# Patient Record
Sex: FEMALE | Race: ASIAN | NOT HISPANIC OR LATINO | Employment: OTHER | URBAN - METROPOLITAN AREA
[De-identification: names, ages, dates, MRNs, and addresses within clinical notes are randomized per-mention and may not be internally consistent; named-entity substitution may affect disease eponyms.]

---

## 2017-01-30 ENCOUNTER — OFFICE VISIT (OUTPATIENT)
Dept: FAMILY MEDICINE | Facility: CLINIC | Age: 52
End: 2017-01-30

## 2017-01-30 ENCOUNTER — RECORDS - HEALTHEAST (OUTPATIENT)
Dept: ADMINISTRATIVE | Facility: OTHER | Age: 52
End: 2017-01-30

## 2017-01-30 VITALS
SYSTOLIC BLOOD PRESSURE: 110 MMHG | HEART RATE: 91 BPM | DIASTOLIC BLOOD PRESSURE: 73 MMHG | OXYGEN SATURATION: 100 % | TEMPERATURE: 97.9 F

## 2017-01-30 DIAGNOSIS — Z12.39 SCREENING FOR BREAST CANCER: ICD-10-CM

## 2017-01-30 DIAGNOSIS — E11.9 TYPE 2 DIABETES MELLITUS WITHOUT COMPLICATION, WITHOUT LONG-TERM CURRENT USE OF INSULIN (H): Primary | ICD-10-CM

## 2017-01-30 DIAGNOSIS — I10 BENIGN ESSENTIAL HYPERTENSION: ICD-10-CM

## 2017-01-30 DIAGNOSIS — Z12.11 SCREEN FOR COLON CANCER: ICD-10-CM

## 2017-01-30 DIAGNOSIS — E78.5 HYPERLIPIDEMIA LDL GOAL <100: ICD-10-CM

## 2017-01-30 LAB
ANION GAP SERPL CALCULATED.3IONS-SCNC: 14 MMOL/L (ref 5–18)
BUN SERPL-MCNC: 23 MG/DL (ref 8–22)
CALCIUM SERPL-MCNC: 9.5 MG/DL (ref 8.5–10.5)
CHLORIDE SERPL-SCNC: 104 MMOL/L (ref 98–107)
CHOLEST SERPL-MCNC: 150 MG/DL
CO2 SERPL-SCNC: 20 MMOL/L (ref 22–31)
CREAT SERPL-MCNC: 0.66 MG/DL (ref 0.6–1.1)
CREAT UR-MCNC: 60.5 MG/DL
FASTING?: ABNORMAL
GLUCOSE SERPL-MCNC: 139 MG/DL (ref 70–125)
HBA1C MFR BLD: 7.5 % (ref 4.1–5.7)
HCT VFR BLD AUTO: 43.7 % (ref 35–47)
HDLC SERPL-MCNC: 46 MG/DL
HEMOGLOBIN: 13.7 G/DL (ref 11.7–15.7)
LDLC SERPL CALC-MCNC: 63 MG/DL
MCH RBC QN AUTO: 28.3 PG (ref 26.5–35)
MCHC RBC AUTO-ENTMCNC: 31.4 G/DL (ref 32–36)
MCV RBC AUTO: 90.3 FL (ref 78–100)
MICROALBUMIN UR-MCNC: 1.6 MG/DL (ref 0–1.99)
MICROALBUMIN/CREAT UR: 26.4 MG/G
PLATELET # BLD AUTO: 276 K/UL (ref 150–450)
POTASSIUM SERPL-SCNC: 4 MMOL/L (ref 3.5–5)
RBC # BLD AUTO: 4.84 M/UL (ref 3.8–5.2)
SODIUM SERPL-SCNC: 138 MMOL/L (ref 136–145)
TRIGL SERPL-MCNC: 206 MG/DL
TSH SERPL DL<=0.05 MIU/L-ACNC: 2.15 UIU/ML (ref 0.3–5)
WBC # BLD AUTO: 8.2 K/UL (ref 4–11)

## 2017-01-30 RX ORDER — ATORVASTATIN CALCIUM 20 MG/1
10 TABLET, FILM COATED ORAL DAILY
Qty: 90 TABLET | Refills: 3 | Status: SHIPPED | OUTPATIENT
Start: 2017-01-30 | End: 2018-02-12

## 2017-01-30 RX ORDER — LISINOPRIL 5 MG/1
5 TABLET ORAL DAILY
Qty: 90 TABLET | Refills: 3 | Status: SHIPPED | OUTPATIENT
Start: 2017-01-30 | End: 2018-02-12

## 2017-01-30 NOTE — LETTER
March 13, 2017      John Chun  [address] [address] [address]        Dear John,    Mammogram results are normal.     Sincerely,    Mary Almanza, DO

## 2017-01-30 NOTE — PATIENT INSTRUCTIONS
Increase daily activities/exercises.    Decrease your rice intake. Brown rice is better than the white rice.     Start a new medication for your diabetes called METFORMIN.       ~~~~~~~~~~~~~~~~~~~~~~~~~~~~~~~~~~~~~~~~~~~~~~~~~~~~~~~~~~~~~~~~~~~~~~~~~~~~  Your medication list is printed, please keep this with you, it is helpful to bring this current list to any other medical appointments, the emergency room or hospital.    If you had lab testing today and your results are reassuring or normal they will be be mailed to you within 7 days.     If the lab tests need quick action we will call you with the results.   The phone number we will call with results is # [phone] (home) . If this is not the best number please call our clinic and change the number.    If you need any refills please call your pharmacy and they will contact us.    If you have any further concerns or wish to schedule another appointment you must call our office during normal business hours  [phone] (8-5:00 M-F)  If you have urgent medical questions that cannot wait  you may also call [phone] at any time of day.  If you have a medical emergency please call [phone].    Thank you for coming to [facility].        Referral for ( TEST )  :      Ophthalmology  LOCATION/PLACE/Provider :    [facility]  [address]    [address]  [place], [place]  DATE & TIME :     2-7-2017 at 1:45  PHONE :     [phone]  FAX :     [phone]  ADDITIONAL INFORMATION :     NA  Appointment made by clinic staff/:    Key        Referral for ( TEST )  :      Mammogram  LOCATION/PLACE/Provider :    [facility] [address] [place] [address]  DATE & TIME :     2-7-17 at 12:15  PHONE :     [phone]  FAX :     [phone]  ADDITIONAL INFORMATION :     NA  Appointment made by clinic staff/:    Key    Called patient 2/17/17 and she has r/s'd both appointments to  3/6/2017  ---Maida,CMA

## 2017-01-30 NOTE — NURSING NOTE
DUE FOR:  Foot Exam  Mammogram---Referral ordered  Eye Exam--- Referral ordered  Colonoscopy-- declines but OK FOR FIT (Given Kit)  Offer Flu Shot--- declined but was offered.   Labs: A1C

## 2017-01-30 NOTE — Clinical Note
February 2, 2017      John Chun  [address] [facility] [address]        Dear John,    Lab results are stable. Continue to take your medications as prescribed and I will see you back at your appt on 2-20-17    Please see below for your test results.    Resulted Orders   Hemoglobin A1c ([facility])   Result Value Ref Range    Hemoglobin A1C 7.5 (H) 4.1 - 5.7 %   CBC with Plt ([facility])   Result Value Ref Range    WBC 8.2 4.0 - 11.0 K/uL    RBC 4.84 3.80 - 5.20 M/uL    Hemoglobin 13.7 11.7 - 15.7 g/dL    Hematocrit 43.7 35.0 - 47.0 %    MCV 90.3 78.0 - 100.0 fL    MCH 28.3 26.5 - 35.0 pg    MCHC 31.4 (L) 32.0 - 36.0 g/dL    Platelets 276.0 150.0 - 450.0 K/uL   TSH  Sensitive ([facility])   Result Value Ref Range    TSH 2.15 0.30 - 5.00 uIU/mL    Narrative    Test performed by:  [facility]  [address] [place] [address]   Microalbumin Creatinine Ratio Random Ur ([facility])   Result Value Ref Range    Microalbumin, Urine 1.60 0.00 - 1.99 mg/dL    Creatinine, Urine 60.5 mg/dL    Albumin Urine mg/g Cr 26.4 (H) <=19.9 mg/g    Narrative    Test performed by:  [facility]  [address] [place] [address]  Microalbumin, Random Urine  <2.0 mg/dL . . . . . . . . Normal  3.0-30.0 mg/dL . . . . . . Microalbuminuria  >30.0 mg/dL . . . . . .  . Clinical Proteinuria  Microalbumin/Creatinine Ratio, Random Urine  <20 mg/g . . . . .. . . . Normal   mg/g . . . . . . . Microalbuminuria  >300 mg/g . . . . . . . . Clinical Proteinuria   Basic Metabolic Profile ([facility])   Result Value Ref Range    Sodium 138 136 - 145 mmol/L    Potassium 4.0 3.5 - 5.0 mmol/L    Chloride 104 98 - 107 mmol/L    CO2, Total 20 (L) 22 - 31 mmol/L    Anion Gap 14 5 - 18 mmol/L    Glucose 139 (H) 70 - 125 mg/dL    Calcium 9.5 8.5 - 10.5 mg/dL    Urea Nitrogen 23 (H) 8 - 22 mg/dL    Creatinine 0.66 0.60 - 1.10 mg/dL    GFR Estimate If Black >60 >60 mL/min/1.73m2    GFR Estimate >60 >60 mL/min/1.73m2    Narrative     Test performed by:  [facility]  [address] [place] [address]  Fasting Glucose reference range is 70-99 mg/dL per  American Diabetes Association (ADA) guidelines.   Lipid Profile ([employer] [facility])   Result Value Ref Range    Triglycerides 206 (H) <=149 mg/dL    Cholesterol 150 <=199 mg/dL    LDL Cholesterol Calculated 63 <=129 mg/dL    HDL Cholesterol 46 (L) >=50 mg/dL    Fasting? Unknown     Narrative    Test performed by:  [facility] [address] [place] [address]       If you have any questions, please call the clinic to make an appointment.    Sincerely,    Mary Almanza, DO

## 2017-01-30 NOTE — MR AVS SNAPSHOT
After Visit Summary   1/30/2017    John Chun    MRN: [id number]           Patient Information     Date Of Birth          1965        Visit Information        Provider Department      1/30/2017 9:20 AM Mary Almanza DO [facility]        Today's Diagnoses     Type 2 diabetes mellitus without complication, without long-term current use of insulin (H)    -  1     Benign essential hypertension         Hyperlipidemia LDL goal <100         Screen for colon cancer         Screening for breast cancer           Care Instructions    Increase daily activities/exercises.    Decrease your rice intake. Brown rice is better than the white rice.     Start a new medication for your diabetes called METFORMIN.       ~~~~~~~~~~~~~~~~~~~~~~~~~~~~~~~~~~~~~~~~~~~~~~~~~~~~~~~~~~~~~~~~~~~~~~~~~~~~  Your medication list is printed, please keep this with you, it is helpful to bring this current list to any other medical appointments, the emergency room or hospital.    If you had lab testing today and your results are reassuring or normal they will be be mailed to you within 7 days.     If the lab tests need quick action we will call you with the results.   The phone number we will call with results is # [phone] (home) . If this is not the best number please call our clinic and change the number.    If you need any refills please call your pharmacy and they will contact us.    If you have any further concerns or wish to schedule another appointment you must call our office during normal business hours  [phone] (8-5:00 M-F)  If you have urgent medical questions that cannot wait  you may also call [phone] at any time of day.  If you have a medical emergency please call [phone].    Thank you for coming to [facility].          Follow-ups after your visit        Additional Services     OPHTHALMOLOGY ADULT REFERRAL       Patient prefers to be called    Diagnosis/Reason for Referral: Screening DM  routine eye exam     needed: No  Language: Hmong      Referral should be tracked? Yes                  Follow-up notes from your care team     Return in about 4 weeks (around 2017) for DM recheck.      Future tests that were ordered for you today     Open Future Orders        Priority Expected Expires Ordered    Fecal Occult Blood - FIT, iFOB ([facility]) Routine  2017            Who to contact     Please call your clinic at [phone] to:    Ask questions about your health    Make or cancel appointments    Discuss your medicines    Learn about your test results    Speak to your doctor   If you have compliments or concerns about an experience at your clinic, or if you wish to file a complaint, please contact [facility] Patient Relations at [phone] or email us at [email or web address]         Additional Information About Your Visit        [email or web address] Information     [facility] is an electronic gateway that provides easy, online access to your medical records. With [employer] [facility], you can request a clinic appointment, read your test results, renew a prescription or communicate with your care team.     To sign up for [employer] [facility] visit the website at [email or web address]   You will be asked to enter the access code listed below, as well as some personal information. Please follow the directions to create your username and password.     Your access code is: [email or web address]  Expires: 2017 10:28 AM     Your access code will  in 90 days. If you need help or a new code, please contact your [facility] or call [phone] for assistance.        Care EveryWhere ID     This is your Care EveryWhere ID. This could be used by other organizations to access your [facility] medical records  [id number]        Your Vitals Were     Pulse Temperature Pulse Oximetry             91 97.9  F (36.6  C) (Oral) 100%          Blood Pressure from  Last 3 Encounters:   01/30/17 110/73   03/23/16 135/88   05/19/15 135/84    Weight from Last 3 Encounters:   03/23/16 121 lb (54.885 kg)   05/19/15 125 lb 14.4 oz (57.108 kg)   05/05/14 118 lb 3.2 oz (53.615 kg)              We Performed the Following     Basic Metabolic Panel ([facility]) - Results < 1 hr     C FOOT EXAM  NO CHARGE     CBC with Plt ([facility])     Hemoglobin A1c ([facility])     Lipid Panel ([facility]) - Results < 1 hr     MA SCREENING DIGITAL BILAT     Microalbumin Creatinine Ratio Random Ur ([facility])     OPHTHALMOLOGY ADULT REFERRAL     TSH  Sensitive ([facility])          Today's Medication Changes          These changes are accurate as of: 1/30/17 10:28 AM.  If you have any questions, ask your nurse or doctor.               Start taking these medicines.        Dose/Directions    blood glucose lancets standard   Commonly known as:  no brand specified   Used for:  Type 2 diabetes mellitus without complication, without long-term current use of insulin (H)   Started by:  Mary Almanza DO        Use to test blood sugar 2 times daily or as directed.   Quantity:  1 Box   Refills:  3       blood glucose monitoring meter device kit   Used for:  Type 2 diabetes mellitus without complication, without long-term current use of insulin (H)   Started by:  Mary Almanza DO        Check sugars 2 times a day. May Substitute covered meter and supplies   Quantity:  1 kit   Refills:  0       metFORMIN 500 MG tablet   Commonly known as:  GLUCOPHAGE   Used for:  Type 2 diabetes mellitus without complication, without long-term current use of insulin (H)   Started by:  Mary Almanza DO        Dose:  500 mg   Take 1 tablet (500 mg) by mouth 2 times daily (with meals)   Quantity:  180 tablet   Refills:  1         These medicines have changed or have updated prescriptions.        Dose/Directions    blood glucose monitoring test strip   Commonly known as:  no brand specified   This may have changed:    - how to take this  -  when to take this  - additional instructions  - Another medication with the same name was removed. Continue taking this medication, and follow the directions you see here.   Used for:  Type 2 diabetes mellitus without complication, without long-term current use of insulin (H)   Changed by:  Mary Almanza, DO        Use to test blood sugars 2 times daily or as directed for Christi or may substitute   Quantity:  100 strip   Refills:  3            Where to get your medicines      These medications were sent to [id number] - [place], [place] - [address] AT [facility]  [address], [place] [address]     Phone:  [phone]    - atorvastatin 20 MG tablet  - blood glucose lancets standard  - blood glucose monitoring meter device kit  - blood glucose monitoring test strip  - lisinopril 5 MG tablet  - metFORMIN 500 MG tablet             Primary Care Provider Office Phone # Fax #    Angela Pantoja PA-C [phone]       [facility] [address] [address] [address] [address]        Thank you!     Thank you for choosing [facility]  for your care. Our goal is always to provide you with excellent care. Hearing back from our patients is one way we can continue to improve our services. Please take a few minutes to complete the written survey that you may receive in the mail after your visit with us. Thank you!             Your Updated Medication List - Protect others around you: Learn how to safely use, store and throw away your medicines at www.disposemymeds.org.          This list is accurate as of: 1/30/17 10:28 AM.  Always use your most recent med list.                   Brand Name Dispense Instructions for use    atorvastatin 20 MG tablet    LIPITOR    90 tablet    Take 0.5 tablets (10 mg) by mouth daily For 1 week, then increase to 1 tab daily       blood glucose lancets standard    no brand specified    1 Box    Use  to test blood sugar 2 times daily or as directed.       blood glucose monitoring meter device kit     1 kit    Check sugars 2 times a day. May Substitute covered meter and supplies       blood glucose monitoring test strip    no brand specified    100 strip    Use to test blood sugars 2 times daily or as directed for Christi or may substitute       lisinopril 5 MG tablet    PRINIVIL/ZESTRIL    90 tablet    Take 1 tablet (5 mg) by mouth daily       metFORMIN 500 MG tablet    GLUCOPHAGE    180 tablet    Take 1 tablet (500 mg) by mouth 2 times daily (with meals)

## 2017-01-30 NOTE — PROGRESS NOTES
HPI:       John Chun is a 51 year old  female with a significant past medical history of diabetes and hypertension who presents for follow up of concern(s) listed below    1. Diabetes: has been out of her strips for a few months, had been checking daily, fasting am (), after lunch 132, after dinner 105-243. She is currently diet controlled, however she does eat rice daily and limited exercise due to time constraints.  2. Hyperlipidemia: has been on statin therapy and tolerates medication with no adverse side effects  3. Blood pressure: Currently on ACEI and tolerates well.     [employer] - [employer]! was used as a [place]  was used for this visit.         PMHX:     Patient Active Problem List   Diagnosis     Type 2 diabetes mellitus without complication (HCC)     Benign essential hypertension     Hyperlipidemia LDL goal <100     Perimenopausal       Current Outpatient Prescriptions   Medication Sig Dispense Refill     atorvastatin (LIPITOR) 20 MG tablet Take 0.5 tablets (10 mg) by mouth daily For 1 week, then increase to 1 tab daily 90 tablet 3     lisinopril (PRINIVIL,ZESTRIL) 5 MG tablet Take 1 tablet (5 mg) by mouth daily 90 tablet 3     blood glucose (ONE TOUCH ULTRA) test strip Use to test blood sugars one time daily or as directed. 100 strip 3     glucose blood VI test strips strip by In Vitro route daily. 1 Box 6       Social History     Social History     Marital Status: Unknown     Spouse Name: N/A     Number of Children: N/A     Years of Education: N/A     Occupational History     Not on file.     Social History Main Topics     Smoking status: Never Smoker      Smokeless tobacco: Not on file     Alcohol Use: No     Drug Use: No     Sexual Activity: No     Other Topics Concern     Not on file     Social History Narrative          Allergies   Allergen Reactions     No Known Drug Allergy        Results for orders placed or performed in visit on 01/30/17 (from the past 24 hour(s))   Hemoglobin A1c ([facility])    Result Value Ref Range    Hemoglobin A1C 7.5 (H) 4.1 - 5.7 %            Review of Systems:   C: NEGATIVE for fatigue, unexpected change in weight  R: NEGATIVE for significant cough or shortness of breath  CV: NEGATIVE for chest pain, palpitations or new or worsening peripheral edema  P: NEGATIVE for changes in mood or affect          Physical Exam:     Filed Vitals:    01/30/17 0934   BP: 110/73   Pulse: 91   Temp: 97.9  F (36.6  C)   TempSrc: Oral   SpO2: 100%     There is no weight on file to calculate BMI.    GENERAL APPEARANCE: healthy, alert and no distress,  EYES: Eyes grossly normal to inspection,  PERRL  HENT: ear canals and TM's normal and nose and mouth without ulcers or lesions  NECK: no adenopathy, no asymmetry, masses, or scars and thyroid normal to palpation  RESP: lungs clear to auscultation - no rales, rhonchi or wheezes  CV: regular rate and rhythm,  and no murmur, click,  rub or gallop  MS: Diabetic foot exam: normal monofilament testing, no sores or lesions of feet, dry skin at heels bilateral      Assessment and Plan   1. Type 2 diabetes mellitus without complication, without long-term current use of insulin (H)    - Hemoglobin A1c ([facility])  - C FOOT EXAM  NO CHARGE  - TSH  Sensitive ([facility])  - Microalbumin Creatinine Ratio Random Ur ([facility])  - blood glucose monitoring ([employer] CONTOUR MONITOR) meter device kit; Check sugars 2 times a day. May Substitute covered meter and supplies  Dispense: 1 kit; Refill: 0  - blood glucose monitoring (NO BRAND SPECIFIED) test strip; Use to test blood sugars 2 times daily or as directed for Christi or may substitute  Dispense: 100 strip; Refill: 3  - OPHTHALMOLOGY ADULT REFERRAL  - metFORMIN (GLUCOPHAGE) 500 MG tablet; Take 1 tablet (500 mg) by mouth 2 times daily (with meals)  Dispense: 180 tablet; Refill: 1  - blood glucose (NO BRAND SPECIFIED) lancets standard; Use to test blood sugar 2 times daily or as directed.  Dispense: 1 Box; Refill: 3  - Return  in 4 weeks to recheck blood sugars and tolerating metformin  Patient was agreeable to starting medication after our discussion of risk vs. benefit and possible adverse side effects      2. Benign essential hypertension    - Basic Metabolic Panel ([facility]) - Results < 1 hr  - CBC with Plt ([facility])  - lisinopril (PRINIVIL/ZESTRIL) 5 MG tablet; Take 1 tablet (5 mg) by mouth daily  Dispense: 90 tablet; Refill: 3    3. Hyperlipidemia LDL goal <100    - Lipid Panel ([facility]) - Results < 1 hr  - atorvastatin (LIPITOR) 20 MG tablet; Take 0.5 tablets (10 mg) by mouth daily For 1 week, then increase to 1 tab daily  Dispense: 90 tablet; Refill: 3    4. Screen for colon cancer    - Fecal Occult Blood - FIT, iFOB ([facility]); Future    5. Screening for breast cancer    - MA SCREENING DIGITAL BILAT        Options for treatment and follow-up care were reviewed with the patient and/or guardian. John Chun and/or guardian engaged in the decision making process and verbalized understanding of the options discussed and agreed with the final plan.    Mary Almanza, DO

## 2017-02-20 ENCOUNTER — OFFICE VISIT (OUTPATIENT)
Dept: FAMILY MEDICINE | Facility: CLINIC | Age: 52
End: 2017-02-20

## 2017-02-20 VITALS
RESPIRATION RATE: 16 BRPM | OXYGEN SATURATION: 100 % | HEART RATE: 90 BPM | HEIGHT: 59 IN | WEIGHT: 122 LBS | TEMPERATURE: 97.9 F | DIASTOLIC BLOOD PRESSURE: 84 MMHG | SYSTOLIC BLOOD PRESSURE: 135 MMHG | BODY MASS INDEX: 24.6 KG/M2

## 2017-02-20 DIAGNOSIS — E11.9 TYPE 2 DIABETES MELLITUS WITHOUT COMPLICATION, WITHOUT LONG-TERM CURRENT USE OF INSULIN (H): ICD-10-CM

## 2017-02-20 NOTE — MR AVS SNAPSHOT
After Visit Summary   2/20/2017    John Chun    MRN: [id number]           Patient Information     Date Of Birth          1965        Visit Information        Provider Department      2/20/2017 9:00 AM Mary Almanza DO [facility]        Today's Diagnoses     Type 2 diabetes mellitus without complication, without long-term current use of insulin (H)          Care Instructions    For your diabetes:    Metformin 500 mg - Take 2 tablets in the morning and 1 tablet in the evening.    Follow up at the end of April 2017 and we will recheck your hemoglobin A1C.          Your medication list is printed, please keep this with you, it is helpful to bring this current list to any other medical appointments, the emergency room or hospital.    If you had lab testing today and your results are reassuring or normal they will be be mailed to you within 7 days.     If the lab tests need quick action we will call you with the results.   The phone number we will call with results is # [phone] (home) . If this is not the best number please call our clinic and change the number.    If you need any refills please call your pharmacy and they will contact us.    If you have any further concerns or wish to schedule another appointment you must call our office during normal business hours  [phone] (8-5:00 M-F)  If you have urgent medical questions that cannot wait  you may also call [phone] at any time of day.  If you have a medical emergency please call [phone].    Thank you for coming to [facility].          Follow-ups after your visit        Who to contact     Please call your clinic at [phone] to:    Ask questions about your health    Make or cancel appointments    Discuss your medicines    Learn about your test results    Speak to your doctor   If you have compliments or concerns about an experience at your clinic, or if you wish to file a complaint, please contact [facility]  "[facility] Patient Relations at [phone] or email us at [email or web address]         Additional Information About Your Visit        Bloomfirehart Information     [employer] is an electronic gateway that provides easy, online access to your medical records. With FortunePay, you can request a clinic appointment, read your test results, renew a prescription or communicate with your care team.     To sign up for [employer] visit the website at [email or web address]   You will be asked to enter the access code listed below, as well as some personal information. Please follow the directions to create your username and password.     Your access code is: [email or web address]  Expires: 2017 10:28 AM     Your access code will  in 90 days. If you need help or a new code, please contact your [facility] or call [phone] for assistance.        Care EveryWhere ID     This is your Care EveryWhere ID. This could be used by other organizations to access your [place] medical records  [id number]        Your Vitals Were     Pulse Temperature Respirations Height Pulse Oximetry BMI (Body Mass Index)    90 97.9  F (36.6  C) (Oral) 16 4' 10.5\" (148.6 cm) 100% 25.06 kg/m2       Blood Pressure from Last 3 Encounters:   17 135/84   17 110/73   16 135/88    Weight from Last 3 Encounters:   17 122 lb (55.3 kg)   16 121 lb (54.9 kg)   05/19/15 125 lb 14.4 oz (57.1 kg)              Today, you had the following     No orders found for display         Today's Medication Changes          These changes are accurate as of: 17  9:30 AM.  If you have any questions, ask your nurse or doctor.               These medicines have changed or have updated prescriptions.        Dose/Directions    metFORMIN 500 MG tablet   Commonly known as:  GLUCOPHAGE   This may have changed:    - how much to take  - how to take this  - when to take this  - additional instructions "   Used for:  Type 2 diabetes mellitus without complication, without long-term current use of insulin (H)   Changed by:  Mary Almanza DO        Take 2 tablets in the morning and 1 tablet in the evening   Quantity:  270 tablet   Refills:  1            Where to get your medicines      These medications were sent to [id number] - [place], [place] - [address] [place] [address] AT [facility]  [address] [place] [address], [place] [address]     Phone:  [phone]     metFORMIN 500 MG tablet                Primary Care Provider Office Phone # Fax #    Mary Archie  [phone]       [facility] [address] [facility] [address]        Thank you!     Thank you for choosing [facility]  for your care. Our goal is always to provide you with excellent care. Hearing back from our patients is one way we can continue to improve our services. Please take a few minutes to complete the written survey that you may receive in the mail after your visit with us. Thank you!             Your Updated Medication List - Protect others around you: Learn how to safely use, store and throw away your medicines at www.disposemymeds.org.          This list is accurate as of: 2/20/17  9:30 AM.  Always use your most recent med list.                   Brand Name Dispense Instructions for use    atorvastatin 20 MG tablet    LIPITOR    90 tablet    Take 0.5 tablets (10 mg) by mouth daily For 1 week, then increase to 1 tab daily       blood glucose lancets standard    no brand specified    1 Box    Use to test blood sugar 2 times daily or as directed.       blood glucose monitoring meter device kit     1 kit    Check sugars 2 times a day. May Substitute covered meter and supplies       blood glucose monitoring test strip    no brand specified    100 strip    Use to test blood sugars 2 times daily or as directed for Christi or may substitute       lisinopril 5 MG tablet     PRINIVIL/ZESTRIL    90 tablet    Take 1 tablet (5 mg) by mouth daily       metFORMIN 500 MG tablet    GLUCOPHAGE    270 tablet    Take 2 tablets in the morning and 1 tablet in the evening

## 2017-02-20 NOTE — PATIENT INSTRUCTIONS
For your diabetes:    Metformin 500 mg - Take 2 tablets in the morning and 1 tablet in the evening.    Follow up at the end of April 2017 and we will recheck your hemoglobin A1C.          Your medication list is printed, please keep this with you, it is helpful to bring this current list to any other medical appointments, the emergency room or hospital.    If you had lab testing today and your results are reassuring or normal they will be be mailed to you within 7 days.     If the lab tests need quick action we will call you with the results.   The phone number we will call with results is # [phone] (home) . If this is not the best number please call our clinic and change the number.    If you need any refills please call your pharmacy and they will contact us.    If you have any further concerns or wish to schedule another appointment you must call our office during normal business hours  [phone] (8-5:00 M-F)  If you have urgent medical questions that cannot wait  you may also call [phone] at any time of day.  If you have a medical emergency please call [phone].    Thank you for coming to [facility].

## 2017-02-20 NOTE — NURSING NOTE
Mammogram- reschedule to March 6, 2017.  Colonoscopy/ fit test - pt still has fit test at home. Will plan for next visit.  Eye exam - pt schedule appt.  Flu shot - pt declined.

## 2017-02-20 NOTE — PROGRESS NOTES
HPI:       John Chun is a 51 year old  female with a significant past medical history of diabetes who presents for follow up of concern(s) listed below    1. Diabetes: We discussed her lab results from her appt on 1-30-17. She is currently on metformin, atorvastatin and lisinopril. Discussed the ACEI is to help protect her kidney function. Despite starting the metformin her blood sugars have remained high, with her midday in the 160s, and her evening blood sugar at 284 (however she only had one value). She did have a little GI upset for 1 week after starting the metformin, but this has resolved.    The 10-year ASCVD risk score (Katelyn AGUDELO Jr., et al., 2013) is: 3%    Values used to calculate the score:      Age: 51 years      Sex: Female      Is Non- : No      Diabetic: Yes      Tobacco smoker: No      Systolic Blood Pressure: 135 mmHg      Is Prescribed Antihypertensives: Yes      HDL Cholesterol: 46 mg/dL      Total Cholesterol: 150 mg/dL     A UserVoice was used as LetGiveong  for this visit.          PMHX:     Patient Active Problem List   Diagnosis     Type 2 diabetes mellitus without complication (HCC)     Benign essential hypertension     Hyperlipidemia LDL goal <100     Perimenopausal       Current Outpatient Prescriptions   Medication Sig Dispense Refill     metFORMIN (GLUCOPHAGE) 500 MG tablet Take 2 tablets in the morning and 1 tablet in the evening 270 tablet 1     lisinopril (PRINIVIL/ZESTRIL) 5 MG tablet Take 1 tablet (5 mg) by mouth daily 90 tablet 3     blood glucose monitoring (CHRISTI CONTOUR MONITOR) meter device kit Check sugars 2 times a day. May Substitute covered meter and supplies 1 kit 0     blood glucose monitoring (NO BRAND SPECIFIED) test strip Use to test blood sugars 2 times daily or as directed for Christi or may substitute 100 strip 3     atorvastatin (LIPITOR) 20 MG tablet Take 0.5 tablets (10 mg) by mouth daily For 1 week, then increase to 1 tab daily  "90 tablet 3     blood glucose (NO BRAND SPECIFIED) lancets standard Use to test blood sugar 2 times daily or as directed. 1 Box 3     [DISCONTINUED] metFORMIN (GLUCOPHAGE) 500 MG tablet Take 1 tablet (500 mg) by mouth 2 times daily (with meals) 180 tablet 1              Allergies   Allergen Reactions     No Known Drug Allergy        No results found for this or any previous visit (from the past 24 hour(s)).           Physical Exam:     Vitals:    02/20/17 0859 02/20/17 0909   BP: 167/82 135/84   Pulse: 88 90   Resp: 16    Temp: 97.9  F (36.6  C)    TempSrc: Oral    SpO2: 100%    Weight: 122 lb (55.3 kg)    Height: 4' 10.5\" (148.6 cm)      Body mass index is 25.06 kg/(m^2).    GENERAL APPEARANCE: healthy, alert and no distress,  HENT: ear canals and TM's normal and nose and mouth without ulcers or lesions  RESP: lungs clear to auscultation - no rales, rhonchi or wheezes  CV: regular rate and rhythm,  and no murmur, click,  rub or gallop      Assessment and Plan     1. Type 2 diabetes mellitus without complication, without long-term current use of insulin (H)  - Will increase her metformin today, recheck A1C 5-1-17  - metFORMIN (GLUCOPHAGE) 500 MG tablet; Take 2 tablets in the morning and 1 tablet in the evening  Dispense: 270 tablet; Refill: 1      Options for treatment and follow-up care were reviewed with the patient and/or guardian. John Chun and/or guardian engaged in the decision making process and verbalized understanding of the options discussed and agreed with the final plan.    Mary Almanza, DO            "

## 2017-03-06 ENCOUNTER — TRANSFERRED RECORDS (OUTPATIENT)
Dept: HEALTH INFORMATION MANAGEMENT | Facility: CLINIC | Age: 52
End: 2017-03-06

## 2017-03-06 ENCOUNTER — HOSPITAL ENCOUNTER (OUTPATIENT)
Dept: MAMMOGRAPHY | Facility: HOSPITAL | Age: 52
Discharge: HOME | End: 2017-03-06
Attending: FAMILY MEDICINE

## 2017-03-06 DIAGNOSIS — Z12.31 VISIT FOR SCREENING MAMMOGRAM: ICD-10-CM

## 2017-03-06 LAB — MAMMOGRAM: NORMAL

## 2017-09-01 ENCOUNTER — RECORDS - HEALTHEAST (OUTPATIENT)
Dept: ADMINISTRATIVE | Facility: OTHER | Age: 52
End: 2017-09-01

## 2017-09-01 ENCOUNTER — OFFICE VISIT (OUTPATIENT)
Dept: FAMILY MEDICINE | Facility: CLINIC | Age: 52
End: 2017-09-01

## 2017-09-01 VITALS
SYSTOLIC BLOOD PRESSURE: 138 MMHG | WEIGHT: 115.4 LBS | TEMPERATURE: 98.1 F | BODY MASS INDEX: 23.26 KG/M2 | DIASTOLIC BLOOD PRESSURE: 88 MMHG | HEART RATE: 98 BPM | HEIGHT: 59 IN | RESPIRATION RATE: 16 BRPM | OXYGEN SATURATION: 98 %

## 2017-09-01 DIAGNOSIS — D49.2 SOFT TISSUE TUMOR: ICD-10-CM

## 2017-09-01 DIAGNOSIS — M75.41 IMPINGEMENT SYNDROME OF RIGHT SHOULDER: ICD-10-CM

## 2017-09-01 DIAGNOSIS — E11.9 TYPE 2 DIABETES MELLITUS WITHOUT COMPLICATION, WITHOUT LONG-TERM CURRENT USE OF INSULIN (H): Primary | ICD-10-CM

## 2017-09-01 LAB
BUN SERPL-MCNC: 13 MG/DL (ref 7–30)
CALCIUM SERPL-MCNC: 9.6 MG/DL (ref 8.5–10.4)
CHLORIDE SERPLBLD-SCNC: 101 MMOL/L (ref 94–109)
CO2 SERPL-SCNC: 28 MMOL/L (ref 20–32)
CREAT SERPL-MCNC: 0.7 MG/DL (ref 0.6–1.3)
EGFR CALCULATED (BLACK REFERENCE): >90 ML/MIN
EGFR CALCULATED (NON BLACK REFERENCE): >90 ML/MIN
GLUCOSE SERPL-MCNC: 248 MG/DL (ref 60–109)
HBA1C MFR BLD: 8.5 % (ref 4.1–5.7)
POTASSIUM SERPL-SCNC: 3.6 MMOL/L (ref 3.4–5.3)
SODIUM SERPL-SCNC: 138 MMOL/L (ref 133–144)

## 2017-09-01 RX ORDER — NAPROXEN 500 MG/1
500 TABLET ORAL 2 TIMES DAILY WITH MEALS
Qty: 60 TABLET | Refills: 1 | Status: SHIPPED | OUTPATIENT
Start: 2017-09-01 | End: 2017-10-03

## 2017-09-01 NOTE — LETTER
September 21, 2017      John Chun  [address] [place] [address]        Dear oJhn,    Scan shows a collection of fluid, no other worrisome finding and that is good. We can just leave it alone, or drain the fluid collection, but it is possible it would come back.     If you have any questions, please call the clinic to make an appointment.    Sincerely,    Mary Almanza, DO

## 2017-09-01 NOTE — PATIENT INSTRUCTIONS
Avoid rice, potatoes, bread and pasta.    New prescription for Metformin 1000 mg - 1 tablet in the morning and 1 tablet in the evening. (this is an increase dose from your previous dose of Metformin 500 mg)    Finish up your metformin 500 mg tablet that you have at home - 2 tablet in am and 2 tablet in pm then start the new dose as noted above.    Follow up 3 months for your diabetes.      Your medication list is printed, please keep this with you, it is helpful to bring this current list to any other medical appointments, the emergency room or hospital.    If you had lab testing today and your results are reassuring or normal they will be be mailed to you within 7 days.     If the lab tests need quick action we will call you with the results.   The phone number we will call with results is # [phone] (home) . If this is not the best number please call our clinic and change the number.    If you need any refills please call your pharmacy and they will contact us.    If you have any further concerns or wish to schedule another appointment you must call our office during normal business hours  [phone] (8-5:00 M-F)  If you have urgent medical questions that cannot wait  you may also call [phone] at any time of day.  If you have a medical emergency please call [phone].    Thank you for coming to [facility].      Referral for ( TEST )  :       MRI Lower Extremity  LOCATION/PLACE/Provider :    [facility]  DATE & TIME :     9- at 5:45pm  PHONE :     [phone]  FAX :     [phone]  ADDITIONAL INFORMATION :     NA  Appointment made by clinic staff/:    RANDI

## 2017-09-01 NOTE — PROGRESS NOTES
HPI:       John Chun is a 52 year old  female with a significant past medical history of diabetes and hypertension who presents for follow up of concern(s) listed below    1. Diabetes: Check blood sugars every morning, 140, 160-170, notices based on what she eats the night if her blood sugars are elevated,  tries to watch her carb intake, no exercise  Does not always take 2 tabs in am the morning with breakfast, 7.5% in 1/2017  No numbness or tingling in feet. No chest pains or shortness of breath    2. Right shoulder: Right shoulder pain x 2-3 weeks, right handed, she does sewing, and notices some discomfort with sewing, unable to lift her arm above her head           PMHX:     Patient Active Problem List   Diagnosis     Type 2 diabetes mellitus without complication (HCC)     Benign essential hypertension     Hyperlipidemia LDL goal <100     Perimenopausal       Current Outpatient Prescriptions   Medication Sig Dispense Refill     metFORMIN (GLUCOPHAGE) 1000 MG tablet Take 1 tablet (1,000 mg) by mouth 2 times daily (with meals) 60 tablet 3     naproxen (NAPROSYN) 500 MG tablet Take 1 tablet (500 mg) by mouth 2 times daily (with meals) 60 tablet 1     [DISCONTINUED] metFORMIN (GLUCOPHAGE) 500 MG tablet Take 2 tablets in the morning and 1 tablet in the evening 270 tablet 1     blood glucose monitoring (CHRISTI CONTOUR MONITOR) meter device kit Check sugars 2 times a day. May Substitute covered meter and supplies 1 kit 0     blood glucose monitoring (NO BRAND SPECIFIED) test strip Use to test blood sugars 2 times daily or as directed for Christi or may substitute 100 strip 3     atorvastatin (LIPITOR) 20 MG tablet Take 0.5 tablets (10 mg) by mouth daily For 1 week, then increase to 1 tab daily 90 tablet 3     lisinopril (PRINIVIL/ZESTRIL) 5 MG tablet Take 1 tablet (5 mg) by mouth daily 90 tablet 3     blood glucose (NO BRAND SPECIFIED) lancets standard Use to test blood sugar 2 times daily or as directed. 1  "Box 3       Social History     Social History     Marital status: Unknown     Spouse name: N/A     Number of children: N/A     Years of education: N/A     Occupational History     Not on file.     Social History Main Topics     Smoking status: Never Smoker     Smokeless tobacco: Not on file     Alcohol use No     Drug use: No     Sexual activity: No     Other Topics Concern     Not on file     Social History Narrative          Allergies   Allergen Reactions     No Known Drug Allergy        Results for orders placed or performed in visit on 09/01/17 (from the past 24 hour(s))   Hemoglobin A1c (P FM)   Result Value Ref Range    Hemoglobin A1C 8.5 (H) 4.1 - 5.7 %   Basic Metabolic Panel (P FM)  - Results < 1 hr   Result Value Ref Range    Glucose 248.0 (H) 60.0 - 109.0 mg/dL    Urea Nitrogen 13.0 7.0 - 30.0 mg/dL    Creatinine 0.7 0.6 - 1.3 mg/dL    Sodium 138.0 133.0 - 144.0 mmol/L    Potassium 3.6 3.4 - 5.3 mmol/L    Chloride 101.0 94.0 - 109.0 mmol/L    Carbon Dioxide 28.0 20.0 - 32.0 mmol/L    Calcium 9.6 8.5 - 10.4 mg/dL    eGFR Calculated (Non Black Reference) >90 >60.0 mL/min    eGFR Calculated (Black Reference) >90 >60.0 mL/min            Review of Systems:   E: NEGATIVE for acute vision problems or changes  R: NEGATIVE for significant cough or shortness of breath  CV: NEGATIVE for chest pain, palpitations or new or worsening peripheral edema  GI: NEGATIVE for new onset or worsening nausea, vomiting or diarrhea  MUSCULOSKELETAL: Right shoulder pain  P: NEGATIVE for changes in mood or affect  Lesion on right buttock, has been present for years, but recently increasing in size, not painful          Physical Exam:     Vitals:    09/01/17 1303   BP: 138/88   BP Location: Right arm   Pulse: 98   Resp: 16   Temp: 98.1  F (36.7  C)   TempSrc: Oral   SpO2: 98%   Weight: 115 lb 6.4 oz (52.3 kg)   Height: 4' 10.5\" (148.6 cm)     Body mass index is 23.71 kg/(m^2).    GENERAL APPEARANCE: healthy, alert and no " distress,  NECK: no adenopathy, no asymmetry, masses, or scars and thyroid normal to palpation  RESP: lungs clear to auscultation - no rales, rhonchi or wheezes  CV: regular rate and rhythm,  and no murmur, click,  rub or gallop  MS: right shoulder: no pain with palpation, unable to lift her arm over her head, limited internal rotation  SKIN: large, 6cm x 4 cm, palpable mass, nontender, no erythema or drainage      Assessment and Plan     1. Type 2 diabetes mellitus without complication, without long-term current use of insulin (H)  - Hemoglobin A1c ([facility])  - Basic Metabolic Panel ([facility])  - Results < 1 hr  - metFORMIN (GLUCOPHAGE) 1000 MG tablet; Take 1 tablet (1,000 mg) by mouth 2 times daily (with meals)  Dispense: 60 tablet; Refill: 3    2. Impingement syndrome of right shoulder  -Recommended PT, but wanted to wait for now  - naproxen (NAPROSYN) 500 MG tablet; Take 1 tablet (500 mg) by mouth 2 times daily (with meals)  Dispense: 60 tablet; Refill: 1    3. Soft tissue tumor  -Has a growing tumor, 7 lb unintentional weight loss since last visit, probably will need biopsy  - MRI Lower extremity non joint rt w/o contrast*      Options for treatment and follow-up care were reviewed with the patient and/or guardian. John Chun and/or guardian engaged in the decision making process and verbalized understanding of the options discussed and agreed with the final plan.    Mary Almanza DO

## 2017-09-01 NOTE — MR AVS SNAPSHOT
After Visit Summary   9/1/2017    John Chun    MRN: [id number]           Patient Information     Date Of Birth          1965        Visit Information        Provider Department      9/1/2017 1:00 PM Mary Almanza DO [facility]        Today's Diagnoses     Type 2 diabetes mellitus without complication, without long-term current use of insulin (H)    -  1    Impingement syndrome of right shoulder        Soft tissue tumor          Care Instructions    Avoid rice, potatoes, bread and pasta.    New prescription for Metformin 1000 mg - 1 tablet in the morning and 1 tablet in the evening. (this is an increase dose from your previous dose of Metformin 500 mg)    Finish up your metformin 500 mg tablet that you have at home - 2 tablet in am and 2 tablet in pm then start the new dose as noted above.    Follow up 3 months for your diabetes.      Your medication list is printed, please keep this with you, it is helpful to bring this current list to any other medical appointments, the emergency room or hospital.    If you had lab testing today and your results are reassuring or normal they will be be mailed to you within 7 days.     If the lab tests need quick action we will call you with the results.   The phone number we will call with results is # [phone] (home) . If this is not the best number please call our clinic and change the number.    If you need any refills please call your pharmacy and they will contact us.    If you have any further concerns or wish to schedule another appointment you must call our office during normal business hours  [phone] (8-5:00 M-F)  If you have urgent medical questions that cannot wait  you may also call [phone] at any time of day.  If you have a medical emergency please call [phone].    Thank you for coming to [facility].            Follow-ups after your visit        Follow-up notes from your care team     Return in about 3 months (around  "12/1/2017) for DM recheck.      Who to contact     Please call your clinic at [phone] to:    Ask questions about your health    Make or cancel appointments    Discuss your medicines    Learn about your test results    Speak to your doctor   If you have compliments or concerns about an experience at your clinic, or if you wish to file a complaint, please contact [facility] Patient Relations at [phone] or email us at [email or web address]         Additional Information About Your Visit        Care EveryWhere ID     This is your Care EveryWhere ID. This could be used by other organizations to access your [place] medical records  [id number]        Your Vitals Were     Pulse Temperature Respirations Height Pulse Oximetry BMI (Body Mass Index)    98 98.1  F (36.7  C) (Oral) 16 4' 10.5\" (148.6 cm) 98% 23.71 kg/m2       Blood Pressure from Last 3 Encounters:   09/01/17 138/88   02/20/17 135/84   01/30/17 110/73    Weight from Last 3 Encounters:   09/01/17 115 lb 6.4 oz (52.3 kg)   02/20/17 122 lb (55.3 kg)   03/23/16 121 lb (54.9 kg)              We Performed the Following     Basic Metabolic Panel ([facility])  - Results < 1 hr     Hemoglobin A1c ([facility])     MRI Lower extremity non joint rt w/o contrast*          Today's Medication Changes          These changes are accurate as of: 9/1/17  2:31 PM.  If you have any questions, ask your nurse or doctor.               Start taking these medicines.        Dose/Directions    naproxen 500 MG tablet   Commonly known as:  NAPROSYN   Used for:  Impingement syndrome of right shoulder   Started by:  Mary Almanza DO        Dose:  500 mg   Take 1 tablet (500 mg) by mouth 2 times daily (with meals)   Quantity:  60 tablet   Refills:  1         These medicines have changed or have updated prescriptions.        Dose/Directions    metFORMIN 1000 MG tablet   Commonly known as:  GLUCOPHAGE   This may have changed:    - medication strength  - " how much to take  - how to take this  - when to take this  - additional instructions   Used for:  Type 2 diabetes mellitus without complication, without long-term current use of insulin (H)   Changed by:  Mary Almanza DO        Dose:  1000 mg   Take 1 tablet (1,000 mg) by mouth 2 times daily (with meals)   Quantity:  60 tablet   Refills:  3            Where to get your medicines      These medications were sent to [id number] - [place], [place] - [address] AT [facility]  [address], [place] [address]     Phone:  [phone]     metFORMIN 1000 MG tablet    naproxen 500 MG tablet                Primary Care Provider Office Phone # Fax #    Mary Almanza [phone]       [facility] [address] [facility] [address]        Equal Access to Services     RADHA [facility] : Hadii marcelle pink hadasho Soomaali, waaxda luqadaha, qaybta kaalmada adeegyada, dre sweeneyin hayandrea cates . So [facility] [phone].    ATENCIÓN: Si habla español, tiene a abdi disposición servicios gratuitos de asistencia lingüística. Llame al [phone].    We comply with applicable federal civil rights laws and [place] laws. We do not discriminate on the basis of race, color, national origin, age, disability sex, sexual orientation or gender identity.            Thank you!     Thank you for choosing [facility]  for your care. Our goal is always to provide you with excellent care. Hearing back from our patients is one way we can continue to improve our services. Please take a few minutes to complete the written survey that you may receive in the mail after your visit with us. Thank you!             Your Updated Medication List - Protect others around you: Learn how to safely use, store and throw away your medicines at www.disposemymeds.org.          This list is accurate as of: 9/1/17  2:31 PM.  Always use your most recent med list.                    Brand Name Dispense Instructions for use Diagnosis    atorvastatin 20 MG tablet    LIPITOR    90 tablet    Take 0.5 tablets (10 mg) by mouth daily For 1 week, then increase to 1 tab daily    Hyperlipidemia LDL goal <100       blood glucose lancets standard    no brand specified    1 Box    Use to test blood sugar 2 times daily or as directed.    Type 2 diabetes mellitus without complication, without long-term current use of insulin (H)       blood glucose monitoring meter device kit     1 kit    Check sugars 2 times a day. May Substitute covered meter and supplies    Type 2 diabetes mellitus without complication, without long-term current use of insulin (H)       blood glucose monitoring test strip    no brand specified    100 strip    Use to test blood sugars 2 times daily or as directed for [employer] or may substitute    Type 2 diabetes mellitus without complication, without long-term current use of insulin (H)       lisinopril 5 MG tablet    PRINIVIL/ZESTRIL    90 tablet    Take 1 tablet (5 mg) by mouth daily    Benign essential hypertension       metFORMIN 1000 MG tablet    GLUCOPHAGE    60 tablet    Take 1 tablet (1,000 mg) by mouth 2 times daily (with meals)    Type 2 diabetes mellitus without complication, without long-term current use of insulin (H)       naproxen 500 MG tablet    NAPROSYN    60 tablet    Take 1 tablet (500 mg) by mouth 2 times daily (with meals)    Impingement syndrome of right shoulder

## 2017-09-12 ENCOUNTER — HOSPITAL ENCOUNTER (OUTPATIENT)
Dept: MRI IMAGING | Facility: HOSPITAL | Age: 52
Discharge: HOME | End: 2017-09-12
Attending: FAMILY MEDICINE

## 2017-09-12 DIAGNOSIS — D49.2 SOFT TISSUE TUMOR: ICD-10-CM

## 2017-10-03 ENCOUNTER — OFFICE VISIT (OUTPATIENT)
Dept: FAMILY MEDICINE | Facility: CLINIC | Age: 52
End: 2017-10-03

## 2017-10-03 VITALS
HEART RATE: 85 BPM | RESPIRATION RATE: 18 BRPM | SYSTOLIC BLOOD PRESSURE: 105 MMHG | OXYGEN SATURATION: 99 % | BODY MASS INDEX: 22.86 KG/M2 | WEIGHT: 113.4 LBS | DIASTOLIC BLOOD PRESSURE: 69 MMHG | TEMPERATURE: 97.9 F | HEIGHT: 59 IN

## 2017-10-03 DIAGNOSIS — E11.9 TYPE 2 DIABETES MELLITUS WITHOUT COMPLICATION, WITHOUT LONG-TERM CURRENT USE OF INSULIN (H): Primary | ICD-10-CM

## 2017-10-03 DIAGNOSIS — D49.2 SOFT TISSUE TUMOR: ICD-10-CM

## 2017-10-03 DIAGNOSIS — Z23 IMMUNIZATION DUE: ICD-10-CM

## 2017-10-03 NOTE — PATIENT INSTRUCTIONS
Referral for ( TEST )  :      General Surgery    LOCATION/PLACE/Provider :    [facility]  [address] [place] [id number]  DATE & TIME :     11-3-2017 at 2:00  PHONE :     [phone]  FAX :     [phone]  ADDITIONAL INFORMATION :     NA  Appointment made by clinic staff/:    RANDI

## 2017-10-03 NOTE — NURSING NOTE
9/28/2017 \[facility] [place] [facility]\"" Previsit Plan   DUE FOR:  Colonoscopy/FIT-offered declined  Flu shot-offered  RYLIE Guaman      1.  Has the patient received the information for the influenza vaccine? YES    2.  Does the patient have any of the following contraindications?     Allergy to eggs? No     Allergic reaction to previous influenza vaccines? No     Any other problems to previous influenza vaccines? No     Paralyzed by Guillain[place] syndrome? No     Currently pregnant? NO     Current moderate or severe illness? No    Vaccination given by RYLIE Guaman.  Recorded by Shaq Nvaarro    I administered flu shot to John Chun. RYLIE Liz

## 2017-10-03 NOTE — PROGRESS NOTES
HPI:       John Chun is a 52 year old  female with a significant past medical history of diabetes who presents for follow up of concern(s) listed below    1. Patient has a large painless growing mass at left buttock region, makes it difficult to sit, she states it may have been there for 10 years, I was very concerned for a soft tissue sarcoma, she had a MRI to further evaluate and it as negative for a sarcoma, but rather a large fluid collection, may be an old hematoma    2. Diabetes: Blood sugars depend on what she eats, 140-170 in the am, at bedtime after dinner 180, her last A1C 8.5%, she has been losing weight.   She states she is very busy chasing her grandkids. She will be due for her next A1C in December. Discussed her lab results from her last visit. She would like her cholesterol checked in December as well.         PMHX:     Patient Active Problem List   Diagnosis     Type 2 diabetes mellitus without complication (HCC)     Benign essential hypertension     Hyperlipidemia LDL goal <100     Perimenopausal       Current Outpatient Prescriptions   Medication Sig Dispense Refill     metFORMIN (GLUCOPHAGE) 1000 MG tablet Take 1 tablet (1,000 mg) by mouth 2 times daily (with meals) 60 tablet 3     blood glucose monitoring (CHRISTI CONTOUR MONITOR) meter device kit Check sugars 2 times a day. May Substitute covered meter and supplies 1 kit 0     blood glucose monitoring (NO BRAND SPECIFIED) test strip Use to test blood sugars 2 times daily or as directed for Christi or may substitute 100 strip 3     atorvastatin (LIPITOR) 20 MG tablet Take 0.5 tablets (10 mg) by mouth daily For 1 week, then increase to 1 tab daily 90 tablet 3     lisinopril (PRINIVIL/ZESTRIL) 5 MG tablet Take 1 tablet (5 mg) by mouth daily 90 tablet 3     blood glucose (NO BRAND SPECIFIED) lancets standard Use to test blood sugar 2 times daily or as directed. 1 Box 3              Allergies   Allergen Reactions     No Known Drug Allergy   "      No results found for this or any previous visit (from the past 24 hour(s)).              Physical Exam:     Vitals:    10/03/17 1444   BP: 105/69   Pulse: 85   Resp: 18   Temp: 97.9  F (36.6  C)   TempSrc: Oral   SpO2: 99%   Weight: 113 lb 6.4 oz (51.4 kg)   Height: 4' 10.5\" (148.6 cm)     Body mass index is 23.3 kg/(m^2).    GENERAL APPEARANCE: healthy, alert and no distress,  RESP: lungs clear to auscultation - no rales, rhonchi or wheezes  CV: regular rate and rhythm,  and no murmur, click,  rub or gallop  MS: inferior to left buttock has a soft lesion 6 x 7 cm, nontender      Assessment and Plan     1. Type 2 diabetes mellitus without complication, without long-term current use of insulin (H)  -Continue metformin, minimize carbohydrates    2. Soft tissue tumor  -Benign, but she would like removed, due to size will have her consult with general surgery  - GENERAL SURG ADULT REFERRAL    3. Immunization due    - FLU VAC PRESRV FREE QUAD SPLIT VIR 3+YRS IM, 0.5 mL dosage  - ADMIN VACCINE, INITIAL      Options for treatment and follow-up care were reviewed with the patient and/or guardian. John Chun and/or guardian engaged in the decision making process and verbalized understanding of the options discussed and agreed with the final plan.    Mary Almanza, DO          "

## 2017-10-03 NOTE — MR AVS SNAPSHOT
"              After Visit Summary   10/3/2017    John Chun    MRN: [id number]           Patient Information     Date Of Birth          1965        Visit Information        Provider Department      10/3/2017 2:40 PM [facility]        Today's Diagnoses     Type 2 diabetes mellitus without complication, without long-term current use of insulin (H)    -  1    Soft tissue tumor           Follow-ups after your visit        Additional Services     GENERAL SURG ADULT REFERRAL       Reason for Referral: [facility]. Left buttock fluid collection, would like removed 6 cm x 7 cm     needed: No  Language: English    May leave message on voicemail: Yes    ([facility] Only) Referral should be tracked (Yes/No)?Yes                  Follow-up notes from your care team     Return in about 2 months (around 12/8/2017) for DM recheck.      Who to contact     Please call your clinic at [phone] to:    Ask questions about your health    Make or cancel appointments    Discuss your medicines    Learn about your test results    Speak to your doctor   If you have compliments or concerns about an experience at your clinic, or if you wish to file a complaint, please contact [facility] Patient Relations at [phone] or email us at [email or web address]         Additional Information About Your Visit        Care EveryWhere ID     This is your Care EveryWhere ID. This could be used by other organizations to access your [place] medical records  [id number]        Your Vitals Were     Pulse Temperature Respirations Height Pulse Oximetry BMI (Body Mass Index)    85 97.9  F (36.6  C) (Oral) 18 4' 10.5\" (148.6 cm) 99% 23.3 kg/m2       Blood Pressure from Last 3 Encounters:   10/03/17 105/69   09/01/17 138/88   02/20/17 135/84    Weight from Last 3 Encounters:   10/03/17 113 lb 6.4 oz (51.4 kg)   09/01/17 115 lb 6.4 oz (52.3 kg)   02/20/17 122 lb (55.3 kg)    "           We Performed the Following     GENERAL SURG ADULT REFERRAL        Primary Care Provider Office Phone # Fax #    Mary Almanza [phone]       [facility] [address] [place] [address]        Equal Access to Services     GAGE CADENA : Hadii aad ku hadasho Soomaali, waaxda luqadaha, qaybta kaalmada adeegyada, waxay idiin hayhortensian adeherbie de la torre darryn shrestha. So [facility] [phone].    ATENCIÓN: Si habla español, tiene a abdi disposición servicios gratuitos de asistencia lingüística. Llame al [phone].    We comply with applicable federal civil rights laws and [place] laws. We do not discriminate on the basis of race, color, national origin, age, disability, sex, sexual orientation, or gender identity.            Thank you!     Thank you for choosing [facility]  for your care. Our goal is always to provide you with excellent care. Hearing back from our patients is one way we can continue to improve our services. Please take a few minutes to complete the written survey that you may receive in the mail after your visit with us. Thank you!             Your Updated Medication List - Protect others around you: Learn how to safely use, store and throw away your medicines at www.disposemymeds.org.          This list is accurate as of: 10/3/17  3:02 PM.  Always use your most recent med list.                   Brand Name Dispense Instructions for use Diagnosis    atorvastatin 20 MG tablet    LIPITOR    90 tablet    Take 0.5 tablets (10 mg) by mouth daily For 1 week, then increase to 1 tab daily    Hyperlipidemia LDL goal <100       blood glucose lancets standard    no brand specified    1 Box    Use to test blood sugar 2 times daily or as directed.    Type 2 diabetes mellitus without complication, without long-term current use of insulin (H)       blood glucose monitoring meter device kit     1 kit    Check sugars 2 times a day. May Substitute covered meter and supplies     Type 2 diabetes mellitus without complication, without long-term current use of insulin (H)       blood glucose monitoring test strip    no brand specified    100 strip    Use to test blood sugars 2 times daily or as directed for Christi or may substitute    Type 2 diabetes mellitus without complication, without long-term current use of insulin (H)       lisinopril 5 MG tablet    PRINIVIL/ZESTRIL    90 tablet    Take 1 tablet (5 mg) by mouth daily    Benign essential hypertension       metFORMIN 1000 MG tablet    GLUCOPHAGE    60 tablet    Take 1 tablet (1,000 mg) by mouth 2 times daily (with meals)    Type 2 diabetes mellitus without complication, without long-term current use of insulin (H)

## 2017-10-05 ENCOUNTER — AMBULATORY - HEALTHEAST (OUTPATIENT)
Dept: SURGERY | Facility: CLINIC | Age: 52
End: 2017-10-05

## 2017-10-05 DIAGNOSIS — D49.2 ODONTOGENIC TUMOR: ICD-10-CM

## 2017-11-03 ENCOUNTER — OFFICE VISIT - HEALTHEAST (OUTPATIENT)
Dept: SURGERY | Facility: CLINIC | Age: 52
End: 2017-11-03

## 2017-11-03 DIAGNOSIS — L72.9 CYST OF BUTTOCKS: ICD-10-CM

## 2018-01-25 DIAGNOSIS — E11.9 TYPE 2 DIABETES MELLITUS WITHOUT COMPLICATION, WITHOUT LONG-TERM CURRENT USE OF INSULIN (H): ICD-10-CM

## 2018-01-29 NOTE — TELEPHONE ENCOUNTER
1.29.18 9:32am Spoke to pt, she is planning to see her sick father in [place] for 1 week and will call back to schedule when she returns, since is a lot going on for her. Agusto, RYLIE.

## 2018-02-12 ENCOUNTER — OFFICE VISIT (OUTPATIENT)
Dept: FAMILY MEDICINE | Facility: CLINIC | Age: 53
End: 2018-02-12
Payer: COMMERCIAL

## 2018-02-12 VITALS
RESPIRATION RATE: 18 BRPM | OXYGEN SATURATION: 99 % | BODY MASS INDEX: 22.94 KG/M2 | SYSTOLIC BLOOD PRESSURE: 130 MMHG | DIASTOLIC BLOOD PRESSURE: 84 MMHG | HEIGHT: 59 IN | WEIGHT: 113.8 LBS | TEMPERATURE: 97.7 F | HEART RATE: 73 BPM

## 2018-02-12 DIAGNOSIS — E78.5 HYPERLIPIDEMIA LDL GOAL <100: ICD-10-CM

## 2018-02-12 DIAGNOSIS — I10 BENIGN ESSENTIAL HYPERTENSION: ICD-10-CM

## 2018-02-12 DIAGNOSIS — E11.9 TYPE 2 DIABETES MELLITUS WITHOUT COMPLICATION, WITHOUT LONG-TERM CURRENT USE OF INSULIN (H): Primary | ICD-10-CM

## 2018-02-12 LAB
BUN SERPL-MCNC: 12 MG/DL (ref 7–30)
CALCIUM SERPL-MCNC: 10.3 MG/DL (ref 8.5–10.4)
CHLORIDE SERPLBLD-SCNC: 101 MMOL/L (ref 94–109)
CHOLEST SERPL-MCNC: 161 MG/DL
CHOLEST/HDLC SERPL: 2.9 RATIO
CO2 SERPL-SCNC: 27 MMOL/L (ref 20–32)
CREAT SERPL-MCNC: 0.8 MG/DL (ref 0.6–1.3)
CREAT UR-MCNC: 95.2 MG/DL
EGFR CALCULATED (BLACK REFERENCE): >90 ML/MIN
EGFR CALCULATED (NON BLACK REFERENCE): 80.4 ML/MIN
GLUCOSE SERPL-MCNC: 159 MG/DL (ref 60–109)
HBA1C MFR BLD: 8.1 % (ref 4.1–5.7)
HDLC SERPL-MCNC: 55 MG/DL
LDLC SERPL CALC-MCNC: 60 MG/DL (ref 0–99)
POTASSIUM SERPL-SCNC: 4 MMOL/L (ref 3.4–5.3)
PROT UR-MCNC: 11 MG/DL
PROTEIN/CREAT RATIO, RANDOM UR: 0.12
SODIUM SERPL-SCNC: 142 MMOL/L (ref 133–144)
TRIGL SERPL-MCNC: 229 MG/DL
VLDL-CHOLESTEROL: 46 MG/DL (ref 7–32)

## 2018-02-12 RX ORDER — LISINOPRIL 5 MG/1
5 TABLET ORAL DAILY
Qty: 90 TABLET | Refills: 3 | Status: SHIPPED | OUTPATIENT
Start: 2018-02-12 | End: 2018-12-17

## 2018-02-12 RX ORDER — ATORVASTATIN CALCIUM 20 MG/1
10 TABLET, FILM COATED ORAL DAILY
Qty: 90 TABLET | Refills: 3 | Status: SHIPPED | OUTPATIENT
Start: 2018-02-12 | End: 2018-12-17

## 2018-02-12 RX ORDER — GLIPIZIDE 5 MG/1
5 TABLET, FILM COATED, EXTENDED RELEASE ORAL DAILY
Qty: 90 TABLET | Refills: 3 | Status: SHIPPED | OUTPATIENT
Start: 2018-02-12 | End: 2018-04-19 | Stop reason: ALTCHOICE

## 2018-02-12 NOTE — MR AVS SNAPSHOT
"              After Visit Summary   2/12/2018    John Chun    MRN: [id number]           Patient Information     Date Of Birth          1965        Visit Information        Provider Department      2/12/2018 8:40 AM Budd, Jennifer, DO [facility]        Today's Diagnoses     Type 2 diabetes mellitus without complication, without long-term current use of insulin (H)    -  1    Benign essential hypertension        Hyperlipidemia LDL goal <100          Care Instructions    Start glipizide once a day for your diabetes. Return to clinic in 3 months to repeat your A1C (diabetes test).          Follow-ups after your visit        Follow-up notes from your care team     Return in about 3 months (around 5/12/2018) for DM recheck.      Who to contact     Please call your clinic at [phone] to:    Ask questions about your health    Make or cancel appointments    Discuss your medicines    Learn about your test results    Speak to your doctor            Additional Information About Your Visit        Care EveryWhere ID     This is your Care EveryWhere ID. This could be used by other organizations to access your [place] medical records  [id number]        Your Vitals Were     Pulse Temperature Respirations Height Pulse Oximetry BMI (Body Mass Index)    73 97.7  F (36.5  C) (Oral) 18 4' 11\" (149.9 cm) 99% 22.98 kg/m2       Blood Pressure from Last 3 Encounters:   02/12/18 130/84   10/03/17 105/69   09/01/17 138/88    Weight from Last 3 Encounters:   02/12/18 113 lb 12.8 oz (51.6 kg)   10/03/17 113 lb 6.4 oz (51.4 kg)   09/01/17 115 lb 6.4 oz (52.3 kg)              We Performed the Following     Basic Metabolic Panel ([facility]) - Results < 1 hr     C FOOT EXAM  NO CHARGE     Hemoglobin A1c ([facility])          Today's Medication Changes          These changes are accurate as of 2/12/18  9:01 AM.  If you have any questions, ask your nurse or doctor.               Start taking these medicines.        Dose/Directions    " aspirin 81 MG tablet   Used for:  Type 2 diabetes mellitus without complication, without long-term current use of insulin (H)   Started by:  Mary Almanza DO        Dose:  81 mg   Take 1 tablet (81 mg) by mouth daily   Quantity:  90 tablet   Refills:  3       glipiZIDE 5 MG 24 hr tablet   Commonly known as:  glipiZIDE XL   Used for:  Type 2 diabetes mellitus without complication, without long-term current use of insulin (H)   Started by:  Mary Almanza DO        Dose:  5 mg   Take 1 tablet (5 mg) by mouth daily   Quantity:  90 tablet   Refills:  3            Where to get your medicines      [id number] - [place], [place] - [address] AT [address], [place] [address]     Phone:  [phone]     atorvastatin 20 MG tablet    glipiZIDE 5 MG 24 hr tablet    lisinopril 5 MG tablet    metFORMIN 1000 MG tablet         Some of these will need a paper prescription and others can be bought over the counter.  Ask your nurse if you have questions.     You don't need a prescription for these medications     aspirin 81 MG tablet                Primary Care Provider Office Phone # Fax #    Mary DO Archie [phone]       [facility] [address] [place] [address]        Equal Access to Services     GAGE CADENA AH: Hadii marcelle ku hadasho Soomaali, waaxda luqadaha, qaybta kaalmada adeegyada, waxay idiin hayaan arturo shrestha. So [facility] [phone].    ATENCIÓN: Si habla español, tiene a abdi disposición servicios gratuitos de asistencia lingüística. Lyndsay al [phone].    We comply with applicable federal civil rights laws and [place] laws. We do not discriminate on the basis of race, color, national origin, age, disability, sex, sexual orientation, or gender identity.            Thank you!     Thank you for choosing [facility]  for your care. Our goal is always to provide you  with excellent care. Hearing back from our patients is one way we can continue to improve our services. Please take a few minutes to complete the written survey that you may receive in the mail after your visit with us. Thank you!             Your Updated Medication List - Protect others around you: Learn how to safely use, store and throw away your medicines at www.disposemymeds.org.          This list is accurate as of 2/12/18  9:01 AM.  Always use your most recent med list.                   Brand Name Dispense Instructions for use Diagnosis    aspirin 81 MG tablet     90 tablet    Take 1 tablet (81 mg) by mouth daily    Type 2 diabetes mellitus without complication, without long-term current use of insulin (H)       atorvastatin 20 MG tablet    LIPITOR    90 tablet    Take 0.5 tablets (10 mg) by mouth daily For 1 week, then increase to 1 tab daily    Hyperlipidemia LDL goal <100       blood glucose lancets standard    no brand specified    1 Box    Use to test blood sugar 2 times daily or as directed.    Type 2 diabetes mellitus without complication, without long-term current use of insulin (H)       blood glucose monitoring meter device kit     1 kit    Check sugars 2 times a day. May Substitute covered meter and supplies    Type 2 diabetes mellitus without complication, without long-term current use of insulin (H)       blood glucose monitoring test strip    no brand specified    100 strip    Use to test blood sugars 2 times daily or as directed for [employer] or may substitute    Type 2 diabetes mellitus without complication, without long-term current use of insulin (H)       glipiZIDE 5 MG 24 hr tablet    glipiZIDE XL    90 tablet    Take 1 tablet (5 mg) by mouth daily    Type 2 diabetes mellitus without complication, without long-term current use of insulin (H)       lisinopril 5 MG tablet    PRINIVIL/ZESTRIL    90 tablet    Take 1 tablet (5 mg) by mouth daily    Benign essential hypertension       metFORMIN  1000 MG tablet    GLUCOPHAGE    180 tablet    Take 1 tablet (1,000 mg) by mouth 2 times daily (with meals)    Type 2 diabetes mellitus without complication, without long-term current use of insulin (H)

## 2018-02-12 NOTE — LETTER
February 15, 2018      John Chun  [address] [place] [address]        Dear John,    Keep taking your medication for you diabetes and cholesterol. Your kidney function is normal.     Please see below for your test results.    Resulted Orders   Hemoglobin A1c ([facility])   Result Value Ref Range    Hemoglobin A1C 8.1 (H) 4.1 - 5.7 %   Basic Metabolic Panel ([facility]) - Results < 1 hr   Result Value Ref Range    Glucose 159.0 (H) 60.0 - 109.0 mg/dL    Urea Nitrogen 12.0 7.0 - 30.0 mg/dL    Creatinine 0.8 0.6 - 1.3 mg/dL    Sodium 142.0 133.0 - 144.0 mmol/L    Potassium 4.0 3.4 - 5.3 mmol/L    Chloride 101.0 94.0 - 109.0 mmol/L    Carbon Dioxide 27.0 20.0 - 32.0 mmol/L    Calcium 10.3 8.5 - 10.4 mg/dL    eGFR Calculated (Non Black Reference) 80.4 >60.0 mL/min    eGFR Calculated (Black Reference) >90 >60.0 mL/min   Lipid Panel ([facility]) - Results < 1 hr   Result Value Ref Range    Cholesterol 161.0 <200.0 mg/dL    Triglycerides 229.0 (H) <150.0 mg/dL    HDL Cholesterol 55.0 >50.0 mg/dL      Comment:      If diabetic or CVD then reference range <100    VLDL-Cholesterol 46.0 (H) 7.0 - 32.0 mg/dL    LDL Cholesterol Direct 60.0 0.0 - 99.0 mg/dL    Cholesterol/HDL Ratio 2.9 <5.0 RATIO   Prot/Creat Random Urine ([facility])   Result Value Ref Range    Protein, Urine 11 mg/dL    Creatinine, Urine 95.2 mg/dL    Protein/Creat Ratio, Random UR 0.12     Narrative    Test performed by:  [facility] [address] [place] [address]  The reference ranges have not been established for  protein, creatinine and the protein/creatinine ratio  in random urines.  The results should be integrated  into the clinical context for interpretation.       If you have any questions, please call the clinic to make an appointment.    Sincerely,    Mary Almanza, DO

## 2018-02-12 NOTE — PROGRESS NOTES
HPI:       John Chun is a 52 year old  female with a significant past medical history of diabetes who presents for follow up of concern(s) listed below    1. Diabetes: Checking blood sugars, fasting in AM: 140, after she eats 290 and that is 1-2 hours after eating, she has been taking her metformin as prescribed but her bs are elevated, no numbness and tingling, her last A1C was 8.5% in 10/2016, she tries to be active    2. High blood pressure: Forgot one dose of BP medication yesterday, but has been stable on the medication. She has had no adverse side effects.    3. HCM: declined colonoscopy, will do mammogram in 1/2019         PMHX:     Patient Active Problem List   Diagnosis     Type 2 diabetes mellitus without complication (HCC)     Benign essential hypertension     Hyperlipidemia LDL goal <100     Perimenopausal       Current Outpatient Prescriptions   Medication Sig Dispense Refill     atorvastatin (LIPITOR) 20 MG tablet Take 0.5 tablets (10 mg) by mouth daily For 1 week, then increase to 1 tab daily 90 tablet 3     lisinopril (PRINIVIL/ZESTRIL) 5 MG tablet Take 1 tablet (5 mg) by mouth daily 90 tablet 3     metFORMIN (GLUCOPHAGE) 1000 MG tablet Take 1 tablet (1,000 mg) by mouth 2 times daily (with meals) 180 tablet 3     aspirin 81 MG tablet Take 1 tablet (81 mg) by mouth daily 90 tablet 3     glipiZIDE (GLIPIZIDE XL) 5 MG 24 hr tablet Take 1 tablet (5 mg) by mouth daily 90 tablet 3     [DISCONTINUED] metFORMIN (GLUCOPHAGE) 1000 MG tablet Take 1 tablet (1,000 mg) by mouth 2 times daily (with meals) 60 tablet 3     blood glucose monitoring (CHRISTI CONTOUR MONITOR) meter device kit Check sugars 2 times a day. May Substitute covered meter and supplies 1 kit 0     blood glucose monitoring (NO BRAND SPECIFIED) test strip Use to test blood sugars 2 times daily or as directed for Christi or may substitute 100 strip 3     blood glucose (NO BRAND SPECIFIED) lancets standard Use to test blood sugar 2 times  daily or as directed. 1 Box 3     [DISCONTINUED] atorvastatin (LIPITOR) 20 MG tablet Take 0.5 tablets (10 mg) by mouth daily For 1 week, then increase to 1 tab daily 90 tablet 3     [DISCONTINUED] lisinopril (PRINIVIL/ZESTRIL) 5 MG tablet Take 1 tablet (5 mg) by mouth daily 90 tablet 3       Social History     Social History     Marital status: Unknown     Spouse name: N/A     Number of children: N/A     Years of education: N/A     Occupational History     Not on file.     Social History Main Topics     Smoking status: Never Smoker     Smokeless tobacco: Never Used     Alcohol use No     Drug use: No     Sexual activity: No     Other Topics Concern     Not on file     Social History Narrative          Allergies   Allergen Reactions     No Known Drug Allergy        Results for orders placed or performed in visit on 02/12/18 (from the past 24 hour(s))   Hemoglobin A1c ([facility])   Result Value Ref Range    Hemoglobin A1C 8.1 (H) 4.1 - 5.7 %   Basic Metabolic Panel ([facility]) - Results < 1 hr   Result Value Ref Range    Glucose 159.0 (H) 60.0 - 109.0 mg/dL    Urea Nitrogen 12.0 7.0 - 30.0 mg/dL    Creatinine 0.8 0.6 - 1.3 mg/dL    Sodium 142.0 133.0 - 144.0 mmol/L    Potassium 4.0 3.4 - 5.3 mmol/L    Chloride 101.0 94.0 - 109.0 mmol/L    Carbon Dioxide 27.0 20.0 - 32.0 mmol/L    Calcium 10.3 8.5 - 10.4 mg/dL    eGFR Calculated (Non Black Reference) 80.4 >60.0 mL/min    eGFR Calculated (Black Reference) >90 >60.0 mL/min            Review of Systems:   C: NEGATIVE for fatigue, unexpected change in weight  E: NEGATIVE for acute vision problems or changes  R: NEGATIVE for significant cough or shortness of breath  CV: NEGATIVE for chest pain, palpitations or new or worsening peripheral edema  P: NEGATIVE for changes in mood or affect          Physical Exam:     Vitals:    02/12/18 0825 02/12/18 0829   BP: 143/85 130/84   Pulse: 73    Resp: 18    Temp: 97.7  F (36.5  C)    TempSrc: Oral    SpO2: 99%    Weight: 113 lb 12.8 oz  "(51.6 kg)    Height: 4' 11\" (149.9 cm)      Body mass index is 22.98 kg/(m^2).    GENERAL APPEARANCE: healthy, alert and no distress,  HENT: ear canals and TM's normal and nose and mouth without ulcers or lesions  NECK: no adenopathy, no asymmetry, masses, or scars and thyroid normal to palpation  RESP: lungs clear to auscultation - no rales, rhonchi or wheezes  CV: regular rate and rhythm,  and no murmur, click,  rub or gallop  MS: extremities normal- no gross deformities noted, bilateral foot exam: normal monofilament exam, no sore or blisters      Assessment and Plan     1. Type 2 diabetes mellitus without complication, without long-term current use of insulin (H)  - Protein/Cr ration  - Hemoglobin A1c ([facility])  - C FOOT EXAM  NO CHARGE  - Continue metFORMIN (GLUCOPHAGE) 1000 MG tablet; Take 1 tablet (1,000 mg) by mouth 2 times daily (with meals)  Dispense: 180 tablet; Refill: 3  - aspirin 81 MG tablet; Take 1 tablet (81 mg) by mouth daily  Dispense: 90 tablet; Refill: 3  - Start glipiZIDE (GLIPIZIDE XL) 5 MG 24 hr tablet; Take 1 tablet (5 mg) by mouth daily  Dispense: 90 tablet; Refill: 3  - Return in 3 months for A1C    2. Benign essential hypertension  - Basic Metabolic Panel ([facility]) - Results < 1 hr  - Continue lisinopril (PRINIVIL/ZESTRIL) 5 MG tablet; Take 1 tablet (5 mg) by mouth daily  Dispense: 90 tablet; Refill: 3    3. Hyperlipidemia LDL goal <100  -  Continue atorvastatin (LIPITOR) 20 MG tablet; Take 0.5 tablets (10 mg) by mouth daily For 1 week, then increase to 1 tab daily  Dispense: 90 tablet; Refill: 3  -Lipid panel      Options for treatment and follow-up care were reviewed with the patient and/or guardian. John Chun and/or guardian engaged in the decision making process and verbalized understanding of the options discussed and agreed with the final plan.    Mary Almanza, DO          "

## 2018-02-12 NOTE — PATIENT INSTRUCTIONS
Start glipizide once a day for your diabetes. Return to clinic in 3 months to repeat your A1C (diabetes test).

## 2018-04-12 ENCOUNTER — OFFICE VISIT (OUTPATIENT)
Dept: FAMILY MEDICINE | Facility: CLINIC | Age: 53
End: 2018-04-12
Payer: COMMERCIAL

## 2018-04-12 VITALS
DIASTOLIC BLOOD PRESSURE: 79 MMHG | SYSTOLIC BLOOD PRESSURE: 128 MMHG | OXYGEN SATURATION: 100 % | RESPIRATION RATE: 16 BRPM | HEIGHT: 59 IN | TEMPERATURE: 97.5 F | HEART RATE: 74 BPM | WEIGHT: 115.2 LBS | BODY MASS INDEX: 23.22 KG/M2

## 2018-04-12 DIAGNOSIS — L03.012 CELLULITIS OF FINGER OF LEFT HAND: Primary | ICD-10-CM

## 2018-04-12 DIAGNOSIS — E11.9 TYPE 2 DIABETES MELLITUS WITHOUT COMPLICATION, WITHOUT LONG-TERM CURRENT USE OF INSULIN (H): ICD-10-CM

## 2018-04-12 RX ORDER — CEPHALEXIN 500 MG/1
500 CAPSULE ORAL 4 TIMES DAILY
Qty: 40 CAPSULE | Refills: 0 | Status: SHIPPED | OUTPATIENT
Start: 2018-04-12 | End: 2018-05-04 | Stop reason: ALTCHOICE

## 2018-04-12 NOTE — PATIENT INSTRUCTIONS
Take your antibiotic (keflex) 4x/day for 10 days  Follow up with [facility] next week  We will call you with the results of the wound culture

## 2018-04-12 NOTE — NURSING NOTE
Declined colonoscopy, but agreed to complete FIT Test she was given at last visit.     name: Nichellelukasz Iniguez  Language: Gavi  Agency: [facility]  Phone number: [phone]

## 2018-04-12 NOTE — PROGRESS NOTES
Preceptor Attestation:   Patient seen, evaluated and discussed with the resident. I have verified the content of the note, which accurately reflects my assessment of the patient and the plan of care.  Supervising Physician:Marcelle Lenz MD  [facility]

## 2018-04-12 NOTE — PROGRESS NOTES
HPI:       John Chun is a 53 year old  female who presents for the new concern(s) of    Finger pain:  - no trauma  - no injury  - 2nd week   - swelling, puss present  - erythematous  - very painful  - no animals at home  - Recently in [place] but no foreign travel  - born in [facility]      DM2:  - on last visit in February 2018 A1C of 8.1  - Started on glipizide  - checking blood sugar daily (100's-140's)  -         PMHX:     Patient Active Problem List   Diagnosis     Type 2 diabetes mellitus without complication (HCC)     Benign essential hypertension     Hyperlipidemia LDL goal <100     Perimenopausal       Current Outpatient Prescriptions   Medication Sig Dispense Refill     atorvastatin (LIPITOR) 20 MG tablet Take 0.5 tablets (10 mg) by mouth daily For 1 week, then increase to 1 tab daily 90 tablet 3     lisinopril (PRINIVIL/ZESTRIL) 5 MG tablet Take 1 tablet (5 mg) by mouth daily 90 tablet 3     metFORMIN (GLUCOPHAGE) 1000 MG tablet Take 1 tablet (1,000 mg) by mouth 2 times daily (with meals) 180 tablet 3     aspirin 81 MG tablet Take 1 tablet (81 mg) by mouth daily 90 tablet 3     glipiZIDE (GLIPIZIDE XL) 5 MG 24 hr tablet Take 1 tablet (5 mg) by mouth daily 90 tablet 3     blood glucose monitoring (CHRISTI CONTOUR MONITOR) meter device kit Check sugars 2 times a day. May Substitute covered meter and supplies 1 kit 0     blood glucose monitoring (NO BRAND SPECIFIED) test strip Use to test blood sugars 2 times daily or as directed for Christi or may substitute 100 strip 3     blood glucose (NO BRAND SPECIFIED) lancets standard Use to test blood sugar 2 times daily or as directed. 1 Box 3       Social History     Social History     Marital status: Unknown     Spouse name: N/A     Number of children: N/A     Years of education: N/A     Occupational History     Not on file.     Social History Main Topics     Smoking status: Never Smoker     Smokeless tobacco: Never Used     Alcohol use No     Drug use: No     Sexual  "activity: No     Other Topics Concern     Not on file     Social History Narrative          Allergies   Allergen Reactions     No Known Drug Allergy        No results found for this or any previous visit (from the past 24 hour(s)).         Review of Systems:   Complete review of systems is negative except as noted in the HPI          Physical Exam:     Vitals:    04/12/18 0906   BP: 128/79   BP Location: Right arm   Patient Position: Sitting   Cuff Size: Adult Regular   Pulse: 74   Resp: 16   Temp: 97.5  F (36.4  C)   TempSrc: Oral   SpO2: 100%   Weight: 115 lb 3.2 oz (52.3 kg)   Height: 4' 11.25\" (150.5 cm)     Body mass index is 23.07 kg/(m^2).    GENERAL APPEARANCE: healthy, alert and no distress,  RESP: lungs clear to auscultation - no rales, rhonchi or wheezes  CV: regular rate and rhythm,  and no murmur, click,  rub or gallop  MS: left 2nd digit has slightly reduced ROM due to pain, normal strength, no numbness  SKIN: erythema and swelling of left 2nd digit on medial aspect with pocket of pus that is able to be discharged with pressure.  Granulation tissue noted. Some erythema and swelling around nail bed as well.    Assessment and Plan     (L03.012) Cellulitis of finger of left hand  (primary encounter diagnosis)  Comment: Unknown nidus for infection. Exam impressive for for abscess pocket.  Drained some in clinic with manual expression but still more unable to express.  Will need close follow up. Not concerned for pseudomonas at this time despite Hx of DM as per home PC glucose checks has had better control recently.  Plan: cephALEXin (KEFLEX) 500 MG capsule, Culture          Wound ([facility]), FU in 1 week    (E11.9) Type 2 diabetes mellitus without complication, without long-term current use of insulin (H)  Comment: Better control likely with starting Glipizide at visit with DR. Almanza in February. Per patient home POC glucose checks of 100-140 which would be an A1C in 5-6 range as compared to 8.1 on last " visit. Will need A1C check in 1 month  Plan: FU in 1 month (not scheduled yet) for A1C check    Options for treatment and follow-up care were reviewed with the patient and/or guardian. John Chun and/or guardian engaged in the decision making process and verbalized understanding of the options discussed and agreed with the final plan.    Pancho Jaime MD      Precepted today with: Marcelle Lenz MD

## 2018-04-13 NOTE — PROGRESS NOTES
Haydee,    Please call the patient and give them my message below,    Ojhn    Your results from your recent clinic visit show that your culture from your wound is growing organisms that are likely susceptible to the current antibiotic I prescribed. We will continue to grow this culture and find out if these organisms are sensitive specifically to the antibiotic you are on. In the meantime you should continue taking the keflex as prescribed and follow up in clinic as we discussed.      Dr. Pancho Jaime

## 2018-04-14 LAB — CULTURE: ABNORMAL

## 2018-04-16 DIAGNOSIS — A49.02 MRSA INFECTION: Primary | ICD-10-CM

## 2018-04-16 RX ORDER — SULFAMETHOXAZOLE/TRIMETHOPRIM 800-160 MG
1 TABLET ORAL 2 TIMES DAILY
Qty: 10 TABLET | Refills: 0 | Status: SHIPPED | OUTPATIENT
Start: 2018-04-16 | End: 2018-04-19

## 2018-04-16 NOTE — PROGRESS NOTES
Haydee,    Please call the patient and give them my message below, also please inquire how patient's finger infection is doing.  If worse, she should not wait for her appointment on 4/19 but come in sooner for a possible drainage.     John    Your results from your recent clinic visit show that the culture is growing a resistant organism.  I have thus changed your antibiotic. I have sent a 5 day course of bactrim to [facility].         Dr. Pancho Jaime

## 2018-04-19 ENCOUNTER — OFFICE VISIT (OUTPATIENT)
Dept: FAMILY MEDICINE | Facility: CLINIC | Age: 53
End: 2018-04-19
Payer: COMMERCIAL

## 2018-04-19 VITALS
WEIGHT: 113 LBS | HEIGHT: 59 IN | OXYGEN SATURATION: 99 % | DIASTOLIC BLOOD PRESSURE: 76 MMHG | HEART RATE: 100 BPM | BODY MASS INDEX: 22.78 KG/M2 | SYSTOLIC BLOOD PRESSURE: 111 MMHG

## 2018-04-19 DIAGNOSIS — A49.02 MRSA INFECTION: Primary | ICD-10-CM

## 2018-04-19 DIAGNOSIS — E11.9 TYPE 2 DIABETES MELLITUS WITHOUT COMPLICATION, WITHOUT LONG-TERM CURRENT USE OF INSULIN (H): ICD-10-CM

## 2018-04-19 DIAGNOSIS — L03.012 CELLULITIS OF FINGER OF LEFT HAND: ICD-10-CM

## 2018-04-19 RX ORDER — GLIPIZIDE 10 MG/1
10 TABLET, FILM COATED, EXTENDED RELEASE ORAL DAILY
Qty: 30 TABLET | Refills: 3 | Status: SHIPPED | OUTPATIENT
Start: 2018-04-19 | End: 2018-10-03

## 2018-04-19 RX ORDER — SULFAMETHOXAZOLE/TRIMETHOPRIM 800-160 MG
1 TABLET ORAL 2 TIMES DAILY
Qty: 10 TABLET | Refills: 0 | Status: SHIPPED | OUTPATIENT
Start: 2018-04-19 | End: 2018-05-04

## 2018-04-19 NOTE — PROGRESS NOTES
HPI:       John Chun is a 53 year old  female with a significant past medical history of diabetes who presents for follow up of concern(s) listed below    1. 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 She denies a needle poke to the finger. On  was her dads  and so she delayed seeking care for her finger until 19. She was placed on Bactrim 2 days ago when the wound culture showed MRSA. She is in clinic today for follow up. She states there is minimal pain and she feels the swelling is improving, she has been trying to keep it covered especially at work. She denies any fevers or chills.    A [employer]  was used for this visit         PMHX:     Patient Active Problem List   Diagnosis     Type 2 diabetes mellitus without complication (HCC)     Benign essential hypertension     Hyperlipidemia LDL goal <100       Current Outpatient Prescriptions   Medication Sig Dispense Refill     aspirin 81 MG tablet Take 1 tablet (81 mg) by mouth daily 90 tablet 3     atorvastatin (LIPITOR) 20 MG tablet Take 0.5 tablets (10 mg) by mouth daily For 1 week, then increase to 1 tab daily 90 tablet 3     blood glucose (NO BRAND SPECIFIED) lancets standard Use to test blood sugar 2 times daily or as directed. 1 Box 3     blood glucose monitoring (CHRISTI CONTOUR MONITOR) meter device kit Check sugars 2 times a day. May Substitute covered meter and supplies 1 kit 0     blood glucose monitoring (NO BRAND SPECIFIED) test strip Use to test blood sugars 2 times daily or as directed for Christi or may substitute 100 strip 3     cephALEXin (KEFLEX) 500 MG capsule Take 1 capsule (500 mg) by mouth 4 times daily 40 capsule 0     glipiZIDE (GLIPIZIDE XL) 10 MG 24 hr tablet Take 1 tablet (10 mg) by mouth daily 30 tablet 3     lisinopril (PRINIVIL/ZESTRIL) 5 MG tablet Take 1 tablet (5 mg) by mouth daily 90 tablet 3     metFORMIN (GLUCOPHAGE) 1000 MG  "tablet Take 1 tablet (1,000 mg) by mouth 2 times daily (with meals) 180 tablet 3     sulfamethoxazole-trimethoprim (BACTRIM DS/SEPTRA DS) 800-160 MG per tablet Take 1 tablet by mouth 2 times daily 10 tablet 0              Allergies   Allergen Reactions     No Known Drug Allergy        No results found for this or any previous visit (from the past 24 hour(s)).           Physical Exam:     Vitals:    04/19/18 1517   BP: 111/76   Pulse: 100   SpO2: 99%   Weight: 113 lb (51.3 kg)   Height: 4' 11.25\" (150.5 cm)     Body mass index is 22.63 kg/(m^2).    GENERAL APPEARANCE: healthy, alert and no distress,  SKIN: Left index finger, 2cm lump with surrounding erythema, hard to palpation, no tenderness, mild discharge      Assessment and Plan     1. MRSA infection  Will dispense another 5 days of antibiotics  - sulfamethoxazole-trimethoprim (BACTRIM DS/SEPTRA DS) 800-160 MG per tablet; Take 1 tablet by mouth 2 times daily  Dispense: 10 tablet; Refill: 0    2. Cellulitis of finger of left hand  Patient feels it is clinically improving, monitor for now, finish the course of medications, try to keep clean and covered    3. Type 2 diabetes mellitus without complication, without long-term current use of insulin (H)  -Due for A1C in May, Diabetes compliactes the medical management of this patient  - glipiZIDE (GLIPIZIDE XL) 10 MG 24 hr tablet; Take 1 tablet (10 mg) by mouth daily  Dispense: 30 tablet; Refill: 3      Options for treatment and follow-up care were reviewed with the patient and/or guardian. John Chun and/or guardian engaged in the decision making process and verbalized understanding of the options discussed and agreed with the final plan.    Mary Almanza DO          "

## 2018-04-19 NOTE — MR AVS SNAPSHOT
After Visit Summary   4/19/2018    John Chun    MRN: [id number]           Patient Information     Date Of Birth          1965        Visit Information        Provider Department      4/19/2018 3:00 PM Mary Almanza DO [facility]        Care Instructions    [facility]:  [address] [place] [address]  [id number]    Phone:  [phone]          Follow-ups after your visit        Who to contact     Please call your clinic at [phone] to:    Ask questions about your health    Make or cancel appointments    Discuss your medicines    Learn about your test results    Speak to your doctor            Additional Information About Your Visit        Care EveryWhere ID     This is your Care EveryWhere ID. This could be used by other organizations to access your [facility] medical records  [id number]         Blood Pressure from Last 3 Encounters:   04/12/18 128/79   02/12/18 130/84   10/03/17 105/69    Weight from Last 3 Encounters:   04/12/18 115 lb 3.2 oz (52.3 kg)   02/12/18 113 lb 12.8 oz (51.6 kg)   10/03/17 113 lb 6.4 oz (51.4 kg)              Today, you had the following     No orders found for display       Primary Care Provider Office Phone # Fax #    Mary DO Archie [phone]       [facility] [address] [place] [address]        Equal Access to Services     GAGE CADENA : Hadii aad ku hadasho Soomaali, waaxda luqadaha, qaybta kaalmada adeegyada, waxay patelin hayhortensian arturo shrestha. So [facility] [phone].    ATENCIÓN: Si habla español, tiene a abdi disposición servicios gratuitos de asistencia lingüística. Llame al [phone].    We comply with applicable federal civil rights laws and [place] laws. We do not discriminate on the basis of race, color, national origin, age, disability, sex, sexual orientation, or gender identity.            Thank you!     Thank you for choosing [facility]  for your care. Our goal is always to provide you  with excellent care. Hearing back from our patients is one way we can continue to improve our services. Please take a few minutes to complete the written survey that you may receive in the mail after your visit with us. Thank you!             Your Updated Medication List - Protect others around you: Learn how to safely use, store and throw away your medicines at www.disposemymeds.org.          This list is accurate as of 4/19/18  3:13 PM.  Always use your most recent med list.                   Brand Name Dispense Instructions for use Diagnosis    aspirin 81 MG tablet     90 tablet    Take 1 tablet (81 mg) by mouth daily    Type 2 diabetes mellitus without complication, without long-term current use of insulin (H)       atorvastatin 20 MG tablet    LIPITOR    90 tablet    Take 0.5 tablets (10 mg) by mouth daily For 1 week, then increase to 1 tab daily    Hyperlipidemia LDL goal <100       blood glucose lancets standard    no brand specified    1 Box    Use to test blood sugar 2 times daily or as directed.    Type 2 diabetes mellitus without complication, without long-term current use of insulin (H)       blood glucose monitoring meter device kit     1 kit    Check sugars 2 times a day. May Substitute covered meter and supplies    Type 2 diabetes mellitus without complication, without long-term current use of insulin (H)       blood glucose monitoring test strip    no brand specified    100 strip    Use to test blood sugars 2 times daily or as directed for Christi or may substitute    Type 2 diabetes mellitus without complication, without long-term current use of insulin (H)       cephALEXin 500 MG capsule    KEFLEX    40 capsule    Take 1 capsule (500 mg) by mouth 4 times daily    Cellulitis of finger of left hand       glipiZIDE 5 MG 24 hr tablet    glipiZIDE XL    90 tablet    Take 1 tablet (5 mg) by mouth daily    Type 2 diabetes mellitus without complication, without long-term current use of insulin (H)        lisinopril 5 MG tablet    PRINIVIL/ZESTRIL    90 tablet    Take 1 tablet (5 mg) by mouth daily    Benign essential hypertension       metFORMIN 1000 MG tablet    GLUCOPHAGE    180 tablet    Take 1 tablet (1,000 mg) by mouth 2 times daily (with meals)    Type 2 diabetes mellitus without complication, without long-term current use of insulin (H)       sulfamethoxazole-trimethoprim 800-160 MG per tablet    BACTRIM DS/SEPTRA DS    10 tablet    Take 1 tablet by mouth 2 times daily    MRSA infection

## 2018-04-26 DIAGNOSIS — E11.9 TYPE 2 DIABETES MELLITUS WITHOUT COMPLICATION, WITHOUT LONG-TERM CURRENT USE OF INSULIN (H): ICD-10-CM

## 2018-05-04 ENCOUNTER — OFFICE VISIT (OUTPATIENT)
Dept: FAMILY MEDICINE | Facility: CLINIC | Age: 53
End: 2018-05-04
Payer: COMMERCIAL

## 2018-05-04 VITALS
TEMPERATURE: 97.8 F | HEIGHT: 59 IN | OXYGEN SATURATION: 100 % | HEART RATE: 80 BPM | WEIGHT: 116.6 LBS | SYSTOLIC BLOOD PRESSURE: 115 MMHG | RESPIRATION RATE: 20 BRPM | BODY MASS INDEX: 23.51 KG/M2 | DIASTOLIC BLOOD PRESSURE: 76 MMHG

## 2018-05-04 DIAGNOSIS — E11.9 TYPE 2 DIABETES MELLITUS WITHOUT COMPLICATION, WITHOUT LONG-TERM CURRENT USE OF INSULIN (H): ICD-10-CM

## 2018-05-04 DIAGNOSIS — L03.012 CELLULITIS OF FINGER OF LEFT HAND: ICD-10-CM

## 2018-05-04 DIAGNOSIS — A49.02 MRSA INFECTION: Primary | ICD-10-CM

## 2018-05-04 NOTE — PROGRESS NOTES
HPI:       John Chun is a 53 year old  female with a significant past medical history of diabetes who presents for follow up of concern(s) listed below    1. Sore on left finger:She hurt her finger on , she uses that finger to hold rubber as she sews it together. She denies a needle poke to the finger. On  was her dads  and so she delayed seeking care for her finger until 19. The wound culture showed MRSA. She was on 10 days of Bactrim. She is in clinic today for follow up. She states there is minimal pain and she feels the swelling is improving, she has been trying to keep it covered especially at work. Has a little bit of pain while working, but is a lot better per the patient's report. She denies any fevers or chills.    2. Diabetes: She has taking her glipizide once a day. She stopped her metformin a few weeks ago because she would like to see what her sugars are doing without taking 2 different medications.         PMHX:     Patient Active Problem List   Diagnosis     Type 2 diabetes mellitus without complication (HCC)     Benign essential hypertension     Hyperlipidemia LDL goal <100       Current Outpatient Prescriptions   Medication Sig Dispense Refill     blood glucose monitoring (NO BRAND SPECIFIED) test strip Use to test blood sugars 2 times daily . Please fill with Contour Next test strips. 100 strip 3     aspirin 81 MG tablet Take 1 tablet (81 mg) by mouth daily 90 tablet 3     atorvastatin (LIPITOR) 20 MG tablet Take 0.5 tablets (10 mg) by mouth daily For 1 week, then increase to 1 tab daily 90 tablet 3     blood glucose (NO BRAND SPECIFIED) lancets standard Use to test blood sugar 2 times daily or as directed. 1 Box 3     blood glucose monitoring (Mtime CONTOUR MONITOR) meter device kit Check sugars 2 times a day. May Substitute covered meter and supplies 1 kit 0     glipiZIDE (GLIPIZIDE XL) 10 MG 24 hr tablet Take 1 tablet (10 mg) by mouth daily 30 tablet 3  "    lisinopril (PRINIVIL/ZESTRIL) 5 MG tablet Take 1 tablet (5 mg) by mouth daily 90 tablet 3     metFORMIN (GLUCOPHAGE) 1000 MG tablet Take 1 tablet (1,000 mg) by mouth 2 times daily (with meals) 180 tablet 3              Allergies   Allergen Reactions     No Known Drug Allergy        No results found for this or any previous visit (from the past 24 hour(s)).           Physical Exam:     Vitals:    05/04/18 0820   BP: 115/76   Pulse: 80   Resp: 20   Temp: 97.8  F (36.6  C)   TempSrc: Oral   SpO2: 100%   Weight: 116 lb 9.6 oz (52.9 kg)   Height: 4' 10.86\" (149.5 cm)     Body mass index is 23.66 kg/(m^2).    GENERAL APPEARANCE: healthy, alert and no distress,  MS: Left index finger, distal aspect slightly enlarged, no erythema,       Assessment and Plan     1. MRSA infection  She finished the course of medication, has been soaking her hand in water with TouchOfModernong medicine (root of a plant)    2. Cellulitis of finger of left hand  Resolved    3. Type 2 diabetes mellitus without complication, without long-term current use of insulin (H)  - blood glucose monitoring (NO BRAND SPECIFIED) test strip; Use to test blood sugars 2 times daily . Please fill with Contour Next test strips.  Dispense: 100 strip; Refill: 3  -Will return on 6-8-18     Options for treatment and follow-up care were reviewed with the patient and/or guardian. John Chun and/or guardian engaged in the decision making process and verbalized understanding of the options discussed and agreed with the final plan.    Mary Almanza DO          "

## 2018-05-04 NOTE — MR AVS SNAPSHOT
"              After Visit Summary   5/4/2018    John Chun    MRN: [id number]           Patient Information     Date Of Birth          1965        Visit Information        Provider Department      5/4/2018 8:20 AM Budd, Jennifer, DO [facility]        Today's Diagnoses     MRSA infection    -  1    Cellulitis of finger of left hand        Type 2 diabetes mellitus without complication, without long-term current use of insulin (H)           Follow-ups after your visit        Your next 10 appointments already scheduled     Jun 08, 2018  8:00 AM CDT   Return Visit with Jennifer Budd, DO [facility] ([facility])    [address] [place] [address]   [phone]              Who to contact     Please call your clinic at [phone] to:    Ask questions about your health    Make or cancel appointments    Discuss your medicines    Learn about your test results    Speak to your doctor            Additional Information About Your Visit        Care EveryWhere ID     This is your Care EveryWhere ID. This could be used by other organizations to access your [place] medical records  [id number]        Your Vitals Were     Pulse Temperature Respirations Height Pulse Oximetry BMI (Body Mass Index)    80 97.8  F (36.6  C) (Oral) 20 4' 10.86\" (149.5 cm) 100% 23.66 kg/m2       Blood Pressure from Last 3 Encounters:   05/04/18 115/76   04/19/18 111/76   04/12/18 128/79    Weight from Last 3 Encounters:   05/04/18 116 lb 9.6 oz (52.9 kg)   04/19/18 113 lb (51.3 kg)   04/12/18 115 lb 3.2 oz (52.3 kg)              Today, you had the following     No orders found for display         Today's Medication Changes          These changes are accurate as of 5/4/18  8:36 AM.  If you have any questions, ask your nurse or doctor.               These medicines have changed or have updated prescriptions.        Dose/Directions    blood glucose monitoring test strip   Commonly known as:  no brand specified "   This may have changed:  additional instructions   Used for:  Type 2 diabetes mellitus without complication, without long-term current use of insulin (H)   Changed by:  Mary Almanza DO        Use to test blood sugars 2 times daily . Please fill with Contour Next test strips.   Quantity:  100 strip   Refills:  3         Stop taking these medicines if you haven't already. Please contact your care team if you have questions.     cephALEXin 500 MG capsule   Commonly known as:  KEFLEX   Stopped by:  Mary Almanza,                 Where to get your medicines      These medications were sent to [id number] - [place], [place] - [address] AT [facility] & [address], [place] [address]     Phone:  [phone]     blood glucose monitoring test strip                Primary Care Provider Office Phone # Fax #    Mary Almanza [phone]       [facility] [address] [facility] [address]        Equal Access to Services     [facility]: Hadii aad ku hadasho Soomaali, waaxda luqadaha, qaybta kaalmada adeegyada, waxay idiin hayaan adeeg kharalibia la'andrea . So [facility] [phone].    ATENCIÓN: Si habla español, tiene a abdi disposición servicios gratuitos de asistencia lingüística. Llame al [phone].    We comply with applicable federal civil rights laws and [place] laws. We do not discriminate on the basis of race, color, national origin, age, disability, sex, sexual orientation, or gender identity.            Thank you!     Thank you for choosing [facility]  for your care. Our goal is always to provide you with excellent care. Hearing back from our patients is one way we can continue to improve our services. Please take a few minutes to complete the written survey that you may receive in the mail after your visit with us. Thank you!             Your Updated Medication List - Protect others around you: Learn how to safely  use, store and throw away your medicines at www.disposemymeds.org.          This list is accurate as of 5/4/18  8:36 AM.  Always use your most recent med list.                   Brand Name Dispense Instructions for use Diagnosis    aspirin 81 MG tablet     90 tablet    Take 1 tablet (81 mg) by mouth daily    Type 2 diabetes mellitus without complication, without long-term current use of insulin (H)       atorvastatin 20 MG tablet    LIPITOR    90 tablet    Take 0.5 tablets (10 mg) by mouth daily For 1 week, then increase to 1 tab daily    Hyperlipidemia LDL goal <100       blood glucose lancets standard    no brand specified    1 Box    Use to test blood sugar 2 times daily or as directed.    Type 2 diabetes mellitus without complication, without long-term current use of insulin (H)       blood glucose monitoring meter device kit     1 kit    Check sugars 2 times a day. May Substitute covered meter and supplies    Type 2 diabetes mellitus without complication, without long-term current use of insulin (H)       blood glucose monitoring test strip    no brand specified    100 strip    Use to test blood sugars 2 times daily . Please fill with Contour Next test strips.    Type 2 diabetes mellitus without complication, without long-term current use of insulin (H)       glipiZIDE 10 MG 24 hr tablet    glipiZIDE XL    30 tablet    Take 1 tablet (10 mg) by mouth daily    Type 2 diabetes mellitus without complication, without long-term current use of insulin (H)       lisinopril 5 MG tablet    PRINIVIL/ZESTRIL    90 tablet    Take 1 tablet (5 mg) by mouth daily    Benign essential hypertension       metFORMIN 1000 MG tablet    GLUCOPHAGE    180 tablet    Take 1 tablet (1,000 mg) by mouth 2 times daily (with meals)    Type 2 diabetes mellitus without complication, without long-term current use of insulin (H)

## 2018-06-08 ENCOUNTER — OFFICE VISIT (OUTPATIENT)
Dept: FAMILY MEDICINE | Facility: CLINIC | Age: 53
End: 2018-06-08
Payer: COMMERCIAL

## 2018-06-08 VITALS
TEMPERATURE: 97.3 F | WEIGHT: 117 LBS | HEART RATE: 64 BPM | SYSTOLIC BLOOD PRESSURE: 112 MMHG | HEIGHT: 59 IN | DIASTOLIC BLOOD PRESSURE: 72 MMHG | OXYGEN SATURATION: 100 % | BODY MASS INDEX: 23.59 KG/M2

## 2018-06-08 DIAGNOSIS — Z12.11 SCREEN FOR COLON CANCER: ICD-10-CM

## 2018-06-08 DIAGNOSIS — A49.02 MRSA INFECTION: ICD-10-CM

## 2018-06-08 DIAGNOSIS — E11.9 TYPE 2 DIABETES MELLITUS WITHOUT COMPLICATION, WITHOUT LONG-TERM CURRENT USE OF INSULIN (H): Primary | ICD-10-CM

## 2018-06-08 LAB — HBA1C MFR BLD: 7.1 % (ref 4.1–5.7)

## 2018-06-08 NOTE — MR AVS SNAPSHOT
"              After Visit Summary   6/8/2018    John Chun    MRN: [id number]           Patient Information     Date Of Birth          1965        Visit Information        Provider Department      6/8/2018 8:00 AM Budd, Jennifer, DO [facility]        Today's Diagnoses     Type 2 diabetes mellitus without complication, without long-term current use of insulin (H)    -  1    Screening for condition        Screen for colon cancer          Care Instructions    1. Continue to take glipizide in the morning  2. Restart the metformin with your evening meal.  3. You are due for your pap test so please schedule when able          Follow-ups after your visit        Follow-up notes from your care team     Return in about 6 months (around 12/8/2018) for DM recheck/pap test.      Future tests that were ordered for you today     Open Future Orders        Priority Expected Expires Ordered    Fecal Occult Blood - FIT, iFOB ([facility]) Routine  6/15/2018 6/8/2018            Who to contact     Please call your clinic at [phone] to:    Ask questions about your health    Make or cancel appointments    Discuss your medicines    Learn about your test results    Speak to your doctor            Additional Information About Your Visit        Care EveryWhere ID     This is your Care EveryWhere ID. This could be used by other organizations to access your [place] medical records  [id number]        Your Vitals Were     Pulse Temperature Height Pulse Oximetry BMI (Body Mass Index)       64 97.3  F (36.3  C) (Oral) 4' 10.86\" (149.5 cm) 100% 23.75 kg/m2        Blood Pressure from Last 3 Encounters:   06/08/18 112/72   05/04/18 115/76   04/19/18 111/76    Weight from Last 3 Encounters:   06/08/18 117 lb (53.1 kg)   05/04/18 116 lb 9.6 oz (52.9 kg)   04/19/18 113 lb (51.3 kg)              We Performed the Following     Hemoglobin A1c ([facility])          Today's Medication Changes          These changes are accurate as of 6/8/18  8:46 " AM.  If you have any questions, ask your nurse or doctor.               These medicines have changed or have updated prescriptions.        Dose/Directions    metFORMIN 1000 MG tablet   Commonly known as:  GLUCOPHAGE   This may have changed:  when to take this   Used for:  Type 2 diabetes mellitus without complication, without long-term current use of insulin (H)   Changed by:  Mary Almanza DO        Dose:  1000 mg   Take 1 tablet (1,000 mg) by mouth daily (with dinner)   Quantity:  90 tablet   Refills:  1            Where to get your medicines      Some of these will need a paper prescription and others can be bought over the counter.  Ask your nurse if you have questions.     You don't need a prescription for these medications     metFORMIN 1000 MG tablet                Primary Care Provider Office Phone # Fax #    Mary Almanza [phone]       [facility] [address] [place] [address]        Equal Access to Services     [facility]: Hadii marcelle pink hadasho Soomaali, waaxda luqadaha, qaybta kaalmada adeegyada, dre [place] hayandrea cates . So [facility] [phone].    ATENCIÓN: Si habla español, tiene a abdi disposición servicios gratuitos de asistencia lingüística. Llame al [phone].    We comply with applicable federal civil rights laws and [place] laws. We do not discriminate on the basis of race, color, national origin, age, disability, sex, sexual orientation, or gender identity.            Thank you!     Thank you for choosing [facility]  for your care. Our goal is always to provide you with excellent care. Hearing back from our patients is one way we can continue to improve our services. Please take a few minutes to complete the written survey that you may receive in the mail after your visit with us. Thank you!             Your Updated Medication List - Protect others around you: Learn how to safely use, store and throw away your medicines at  www.disposemymeds.org.          This list is accurate as of 6/8/18  8:46 AM.  Always use your most recent med list.                   Brand Name Dispense Instructions for use Diagnosis    aspirin 81 MG tablet     90 tablet    Take 1 tablet (81 mg) by mouth daily    Type 2 diabetes mellitus without complication, without long-term current use of insulin (H)       atorvastatin 20 MG tablet    LIPITOR    90 tablet    Take 0.5 tablets (10 mg) by mouth daily For 1 week, then increase to 1 tab daily    Hyperlipidemia LDL goal <100       blood glucose lancets standard    no brand specified    1 Box    Use to test blood sugar 2 times daily or as directed.    Type 2 diabetes mellitus without complication, without long-term current use of insulin (H)       blood glucose monitoring meter device kit     1 kit    Check sugars 2 times a day. May Substitute covered meter and supplies    Type 2 diabetes mellitus without complication, without long-term current use of insulin (H)       blood glucose monitoring test strip    no brand specified    100 strip    Use to test blood sugars 2 times daily . Please fill with Contour Next test strips.    Type 2 diabetes mellitus without complication, without long-term current use of insulin (H)       glipiZIDE 10 MG 24 hr tablet    glipiZIDE XL    30 tablet    Take 1 tablet (10 mg) by mouth daily    Type 2 diabetes mellitus without complication, without long-term current use of insulin (H)       lisinopril 5 MG tablet    PRINIVIL/ZESTRIL    90 tablet    Take 1 tablet (5 mg) by mouth daily    Benign essential hypertension       metFORMIN 1000 MG tablet    GLUCOPHAGE    90 tablet    Take 1 tablet (1,000 mg) by mouth daily (with dinner)    Type 2 diabetes mellitus without complication, without long-term current use of insulin (H)

## 2018-06-08 NOTE — PATIENT INSTRUCTIONS
1. Continue to take glipizide in the morning  2. Restart the metformin with your evening meal.  3. You are due for your pap test so please schedule when able

## 2018-06-08 NOTE — PROGRESS NOTES
HPI:       John Chun is a 53 year old  female with a significant past medical history of diabetes who presents for follow up of concern(s) listed below    1. Diabetes: Her last A1C was 8.1% 2/2018, checking blood sugars in morning and night, this morning was 103, sometimes after she eats at the 1-2 hour check she will be over 200, but never over 300. She has only been taking glipizide, she did not want to take metformin, but was not causing her any concerns. She will have her A1C rechecked today.     2. MRSA infection of the finger- has resolved completely. She is working and is having no pain in the finger. Her insurance company is denying her would culture from 4-12-18 because it was a send out.    3. She is due for pap test, but declined today and she has agreed to FIT test today.         PMHX:     Patient Active Problem List   Diagnosis     Type 2 diabetes mellitus without complication (HCC)     Benign essential hypertension     Hyperlipidemia LDL goal <100     MRSA infection       Current Outpatient Prescriptions   Medication Sig Dispense Refill     aspirin 81 MG tablet Take 1 tablet (81 mg) by mouth daily 90 tablet 3     atorvastatin (LIPITOR) 20 MG tablet Take 0.5 tablets (10 mg) by mouth daily For 1 week, then increase to 1 tab daily 90 tablet 3     blood glucose (NO BRAND SPECIFIED) lancets standard Use to test blood sugar 2 times daily or as directed. 1 Box 3     blood glucose monitoring (WISAM CONTOUR MONITOR) meter device kit Check sugars 2 times a day. May Substitute covered meter and supplies 1 kit 0     blood glucose monitoring (NO BRAND SPECIFIED) test strip Use to test blood sugars 2 times daily . Please fill with Contour Next test strips. 100 strip 3     glipiZIDE (GLIPIZIDE XL) 10 MG 24 hr tablet Take 1 tablet (10 mg) by mouth daily 30 tablet 3     lisinopril (PRINIVIL/ZESTRIL) 5 MG tablet Take 1 tablet (5 mg) by mouth daily 90 tablet 3     metFORMIN (GLUCOPHAGE) 1000 MG tablet Take 1  "tablet (1,000 mg) by mouth daily (with dinner) 90 tablet 1     [DISCONTINUED] metFORMIN (GLUCOPHAGE) 1000 MG tablet Take 1 tablet (1,000 mg) by mouth 2 times daily (with meals) 180 tablet 3       Social History     Social History     Marital status: Unknown     Spouse name: N/A     Number of children: N/A     Years of education: N/A     Occupational History     Not on file.     Social History Main Topics     Smoking status: Never Smoker     Smokeless tobacco: Never Used     Alcohol use No     Drug use: No     Sexual activity: No     Other Topics Concern     Not on file     Social History Narrative          Allergies   Allergen Reactions     No Known Drug Allergy        Results for orders placed or performed in visit on 06/08/18 (from the past 24 hour(s))   Hemoglobin A1c ([facility])   Result Value Ref Range    Hemoglobin A1C 7.1 (H) 4.1 - 5.7 %            Review of Systems:   C: NEGATIVE for fatigue, unexpected change in weight  R: NEGATIVE for significant cough or shortness of breath  CV: NEGATIVE for chest pain, palpitations or new or worsening peripheral edema  P: NEGATIVE for changes in mood or affect          Physical Exam:     Vitals:    06/08/18 0752   BP: 112/72   Pulse: 64   Temp: 97.3  F (36.3  C)   TempSrc: Oral   SpO2: 100%   Weight: 117 lb (53.1 kg)   Height: 4' 10.86\" (149.5 cm)     Body mass index is 23.75 kg/(m^2).    GENERAL APPEARANCE: healthy, alert and no distress,  NECK: no adenopathy, no asymmetry, masses, or scars and thyroid normal to palpation  RESP: lungs clear to auscultation - no rales, rhonchi or wheezes  CV: regular rate and rhythm,  and no murmur, click,  rub or gallop  MS: extremities normal- no gross deformities noted  Her finger has healed from previous infection    Results for orders placed or performed in visit on 06/08/18 (from the past 24 hour(s))   Hemoglobin A1c ([facility])   Result Value Ref Range    Hemoglobin A1C 7.1 (H) 4.1 - 5.7 %       Assessment and Plan     1. Type 2 " diabetes mellitus without complication, without long-term current use of insulin (H)  Stay active, monitor carbohydrates   - Hemoglobin A1c ([facility])  - metFORMIN (GLUCOPHAGE) 1000 MG tablet; Take 1 tablet (1,000 mg) by mouth daily (with dinner)  Dispense: 90 tablet; Refill: 1  - Continue Glipizide    2. Screen for colon cancer  Patient is agreeable to testing  - Fecal Occult Blood - FIT, iFOB ([facility]); Future    3. MRSA infection  Has resolved      Options for treatment and follow-up care were reviewed with the patient and/or guardian. John Chun and/or guardian engaged in the decision making process and verbalized understanding of the options discussed and agreed with the final plan.    Mary Almanza, DO

## 2018-07-22 ENCOUNTER — HEALTH MAINTENANCE LETTER (OUTPATIENT)
Age: 53
End: 2018-07-22

## 2018-08-09 ENCOUNTER — APPOINTMENT (OUTPATIENT)
Dept: LAB | Facility: CLINIC | Age: 53
End: 2018-08-09
Payer: COMMERCIAL

## 2018-10-03 DIAGNOSIS — E11.9 TYPE 2 DIABETES MELLITUS WITHOUT COMPLICATION, WITHOUT LONG-TERM CURRENT USE OF INSULIN (H): ICD-10-CM

## 2018-10-03 NOTE — TELEPHONE ENCOUNTER
Message to physician:     Date of last visit: 6/8/2018     Date of next visit if scheduled: Visit date not found      Last Comprehensive Metabolic Panel:  Sodium   Date Value Ref Range Status   02/12/2018 142.0 133.0 - 144.0 mmol/L Final     Potassium   Date Value Ref Range Status   02/12/2018 4.0 3.4 - 5.3 mmol/L Final     Chloride   Date Value Ref Range Status   02/12/2018 101.0 94.0 - 109.0 mmol/L Final     Carbon Dioxide   Date Value Ref Range Status   02/12/2018 27.0 20.0 - 32.0 mmol/L Final     Anion Gap   Date Value Ref Range Status   03/23/2016 13 3 - 14 mmol/L Final     Glucose   Date Value Ref Range Status   02/12/2018 159.0 (H) 60.0 - 109.0 mg/dL Final     Urea Nitrogen   Date Value Ref Range Status   02/12/2018 12.0 7.0 - 30.0 mg/dL Final     Creatinine   Date Value Ref Range Status   02/12/2018 0.8 0.6 - 1.3 mg/dL Final     GFR Estimate   Date Value Ref Range Status   01/30/2017 >60 >60 mL/min/1.73m2 Final     Calcium   Date Value Ref Range Status   02/12/2018 10.3 8.5 - 10.4 mg/dL Final       BP Readings from Last 3 Encounters:   06/08/18 112/72   05/04/18 115/76   04/19/18 111/76       Lab Results   Component Value Date    A1C 7.1 06/08/2018    A1C 8.1 02/12/2018    A1C 8.5 09/01/2017    A1C 7.5 01/30/2017    A1C 6.8 03/23/2016                Please complete refill and CLOSE ENCOUNTER.  Closing the encounter signifies the refill is complete.

## 2018-10-04 RX ORDER — GLIPIZIDE 10 MG/1
10 TABLET, FILM COATED, EXTENDED RELEASE ORAL DAILY
Qty: 90 TABLET | Refills: 3 | Status: SHIPPED | OUTPATIENT
Start: 2018-10-04 | End: 2018-12-17

## 2018-12-17 ENCOUNTER — OFFICE VISIT (OUTPATIENT)
Dept: FAMILY MEDICINE | Facility: CLINIC | Age: 53
End: 2018-12-17
Payer: COMMERCIAL

## 2018-12-17 VITALS
HEIGHT: 59 IN | OXYGEN SATURATION: 97 % | DIASTOLIC BLOOD PRESSURE: 79 MMHG | WEIGHT: 115 LBS | HEART RATE: 78 BPM | TEMPERATURE: 97.5 F | SYSTOLIC BLOOD PRESSURE: 132 MMHG | RESPIRATION RATE: 12 BRPM | BODY MASS INDEX: 23.18 KG/M2

## 2018-12-17 DIAGNOSIS — L72.9 CYST OF BUTTOCKS: ICD-10-CM

## 2018-12-17 DIAGNOSIS — E11.9 TYPE 2 DIABETES MELLITUS WITHOUT COMPLICATION, WITHOUT LONG-TERM CURRENT USE OF INSULIN (H): Primary | ICD-10-CM

## 2018-12-17 DIAGNOSIS — I10 BENIGN ESSENTIAL HYPERTENSION: ICD-10-CM

## 2018-12-17 DIAGNOSIS — E78.5 HYPERLIPIDEMIA LDL GOAL <100: ICD-10-CM

## 2018-12-17 DIAGNOSIS — Z12.4 SCREENING FOR CERVICAL CANCER: ICD-10-CM

## 2018-12-17 LAB
BUN SERPL-MCNC: 17 MG/DL (ref 7–30)
CALCIUM SERPL-MCNC: 10.3 MG/DL (ref 8.5–10.4)
CHLORIDE SERPLBLD-SCNC: 101 MMOL/L (ref 94–109)
CO2 SERPL-SCNC: 29 MMOL/L (ref 20–32)
CREAT SERPL-MCNC: 0.7 MG/DL (ref 0.6–1.3)
CREAT UR-MCNC: 114.5 MG/DL
EGFR CALCULATED (BLACK REFERENCE): >90 ML/MIN
EGFR CALCULATED (NON BLACK REFERENCE): >90 ML/MIN
GLUCOSE SERPL-MCNC: 151 MG/DL (ref 60–109)
HBA1C MFR BLD: 8.1 % (ref 4.1–5.7)
MICROALBUMIN UR-MCNC: 1.65 MG/DL (ref 0–1.99)
MICROALBUMIN/CREAT UR: 14.4 MG/G
POTASSIUM SERPL-SCNC: 3.6 MMOL/L (ref 3.4–5.3)
SODIUM SERPL-SCNC: 141 MMOL/L (ref 133–144)

## 2018-12-17 RX ORDER — GLIPIZIDE 10 MG/1
10 TABLET, FILM COATED, EXTENDED RELEASE ORAL DAILY
Qty: 90 TABLET | Refills: 3 | Status: SHIPPED | OUTPATIENT
Start: 2018-12-17 | End: 2019-02-04

## 2018-12-17 RX ORDER — LISINOPRIL 5 MG/1
5 TABLET ORAL DAILY
Qty: 90 TABLET | Refills: 3 | Status: SHIPPED | OUTPATIENT
Start: 2018-12-17 | End: 2019-11-22

## 2018-12-17 RX ORDER — ATORVASTATIN CALCIUM 20 MG/1
10 TABLET, FILM COATED ORAL DAILY
Qty: 90 TABLET | Refills: 3 | Status: SHIPPED | OUTPATIENT
Start: 2018-12-17 | End: 2019-11-22

## 2018-12-17 ASSESSMENT — MIFFLIN-ST. JEOR: SCORE: 1035.01

## 2018-12-17 NOTE — NURSING NOTE
Patient reports having colonoscopy down already but is unsure of when and where. Discussed with provider inés

## 2018-12-17 NOTE — LETTER
December 26, 2018        John Chun  [address] [place] [address]        Dear John,    Lab results are normal. You will be due for your next pap test in 5 years.     Please see below for your test results.    Resulted Orders   Hemoglobin A1c ([facility])   Result Value Ref Range    Hemoglobin A1C 8.1 (H) 4.1 - 5.7 %   Basic Metabolic Panel ([facility]) - Results < 1 hr   Result Value Ref Range    Glucose 151.0 (H) 60.0 - 109.0 mg/dL    Urea Nitrogen 17.0 7.0 - 30.0 mg/dL    Creatinine 0.7 0.6 - 1.3 mg/dL    Sodium 141.0 133.0 - 144.0 mmol/L    Potassium 3.6 3.4 - 5.3 mmol/L    Chloride 101.0 94.0 - 109.0 mmol/L    Carbon Dioxide 29.0 20.0 - 32.0 mmol/L    Calcium 10.3 8.5 - 10.4 mg/dL    eGFR Calculated (Non Black Reference) >90 >60.0 mL/min    eGFR Calculated (Black Reference) >90 >60.0 mL/min   Microalbumin Creatinine Ratio Random Ur ([facility])   Result Value Ref Range    Microalbumin, Urine 1.65 0.00 - 1.99 mg/dL    Creatinine, Urine 114.5 mg/dL    Albumin Urine mg/g Cr 14.4 <=19.9 mg/g    Narrative    Test performed by:  [facility] [address] [place] [address]  Microalbumin, Random Urine  <2.0 mg/dL . . . . . . . . Normal  3.0-30.0 mg/dL . . . . . . Microalbuminuria  >30.0 mg/dL . . . . . .  . Clinical Proteinuria  Microalbumin/Creatinine Ratio, Random Urine  <20 mg/g . . . . .. . . . Normal   mg/g . . . . . . . Microalbuminuria  >300 mg/g . . . . . . . . Clinical Proteinuria   GYN Cytology ([facility])   Result Value Ref Range    Lab AP Case Report SEE RESULTS BELOW       Comment:      Gynecologic Cytology Report                       Case: [id number]                                     Authorizing Provider:  Mary Almanza DO       Collected:             12/17/2018 1207              First Screen:          Alicia Daley CT      Received:              12/18/2018 6694                                     (ASCP)                                                                          Specimen:    SUREPATH PAP, SCREENING, Endocervical/cervical                                               Lab AP Gyn Interpretation SEE RESULTS BELOW Negative for squamous intraepithelial le      Comment:      Negative for squamous intraepithelial lesion or malignancy  Electronically signed by SOCORRO Narvaez (ASCP) on 12/22/2018 at 11:23 AM      Lab AP Malignant? Normal Normal    Specimen adequacy:       Satisfactory for evaluation, endocerv/transformation zone component absent, atrophy    HPV Reflex? Yes regardless of result     High Risk? No     Last Mens Period 2012     Lab AP Abnormal Bleeding No     Lab AP Patient Status Menopause     Lab AP Birth Control/Hormones None     Lab AP Previous Normal 2015     Lab AP Previous Abnl No     Lab AP Cervical Appearance normal     Narrative    Test performed by:  [facility] [address] [place] [address]   HPV [place] PCR ([employer])   Result Value Ref Range    HPV Source SurePath     HPV 16 DNA Negative NEG    HPV 18 DNA Negative NEG    Other HR HPV Negative NEG    Final Diagnosis SEE NOTES       Comment:      This patient's sample is negative for HPV DNA.  This test was developed and its performance characteristics determined by the  [facility]. It  has not been cleared or approved by the FDA. The laboratory is regulated under  CLIA as qualified to perform high-complexity testing. This test is used for  clinical purposes. It should not be regarded as investigational or for  research.  (Note)  METHODOLOGY:  The Roche libby 4800 system uses automated extraction,  simultaneous amplification of HPV (L1 region) and beta-globin,  followed by  real time detection of fluorescent labeled HPV and beta  globin using specific oligonucleotide probes . The test specifically  identifies types HPV 16 DNA and HPV 18 DNA while concurrently  detecting the rest of the high risk types (31, 33, 35,  39, 45, 51,  52, 56, 58, 59, 66 or 68).     COMMENTS:  This test is not intended for use as a screening device  for women under age 30 with normal cervical   cytology.  Results should  be correlated with cytologic and histologic findings. Close clinical  followup is recommended.         Specimen Description Cervical Cells       Comment:      PERFORMED AT  [address] [address]       Narrative    Test performed by:  [facility]  [address], [place] [address]       If you have any questions, please call the clinic to make an appointment.    Sincerely,    Mary Almanza, DO

## 2018-12-18 LAB
FINAL DIAGNOSIS: NORMAL
HPV 16 DNA: NEGATIVE
HPV 18 DNA: NEGATIVE
HPV SOURCE: NORMAL
OTHER HR HPV: NEGATIVE
SPECIMEN DESCRIPTION: NORMAL

## 2018-12-22 ENCOUNTER — RECORDS - HEALTHEAST (OUTPATIENT)
Dept: ADMINISTRATIVE | Facility: OTHER | Age: 53
End: 2018-12-22

## 2018-12-22 LAB
CYTOLOGY CVX/VAG DOC THIN PREP: NORMAL
HIGH RISK?: NO
HPV REFLEX?: NORMAL
LAB AP ABNORMAL BLEEDING: NO
LAB AP BIRTH CONTROL/HORMONES: NORMAL
LAB AP CASE REPORT: NORMAL
LAB AP CERVICAL APPEARANCE: NORMAL
LAB AP MALIGNANT?: NORMAL
LAB AP PATIENT STATUS: NORMAL
LAB AP PREVIOUS ABNL: NO
LAB AP PREVIOUS NORMAL: 2015
LAST MENS PERIOD: 2012
SPECIMEN ADEQUACY:: NORMAL

## 2019-02-04 DIAGNOSIS — E11.9 TYPE 2 DIABETES MELLITUS WITHOUT COMPLICATION, WITHOUT LONG-TERM CURRENT USE OF INSULIN (H): ICD-10-CM

## 2019-02-05 RX ORDER — GLIPIZIDE 10 MG/1
10 TABLET, FILM COATED, EXTENDED RELEASE ORAL DAILY
Qty: 90 TABLET | Refills: 1 | Status: SHIPPED | OUTPATIENT
Start: 2019-02-05 | End: 2019-11-22

## 2019-03-15 ENCOUNTER — OFFICE VISIT (OUTPATIENT)
Dept: FAMILY MEDICINE | Facility: CLINIC | Age: 54
End: 2019-03-15

## 2019-03-15 ENCOUNTER — RECORDS - HEALTHEAST (OUTPATIENT)
Dept: ADMINISTRATIVE | Facility: OTHER | Age: 54
End: 2019-03-15

## 2019-03-15 VITALS
TEMPERATURE: 97.6 F | WEIGHT: 113.2 LBS | BODY MASS INDEX: 23.76 KG/M2 | DIASTOLIC BLOOD PRESSURE: 76 MMHG | OXYGEN SATURATION: 97 % | HEART RATE: 85 BPM | RESPIRATION RATE: 18 BRPM | HEIGHT: 58 IN | SYSTOLIC BLOOD PRESSURE: 109 MMHG

## 2019-03-15 DIAGNOSIS — E11.9 TYPE 2 DIABETES MELLITUS WITHOUT COMPLICATION, WITHOUT LONG-TERM CURRENT USE OF INSULIN (H): Primary | ICD-10-CM

## 2019-03-15 DIAGNOSIS — E78.5 HYPERLIPIDEMIA LDL GOAL <100: ICD-10-CM

## 2019-03-15 DIAGNOSIS — I10 BENIGN ESSENTIAL HYPERTENSION: ICD-10-CM

## 2019-03-15 DIAGNOSIS — Z13.9 SCREENING FOR CONDITION: ICD-10-CM

## 2019-03-15 DIAGNOSIS — Z11.59 SCREENING FOR VIRAL DISEASE: ICD-10-CM

## 2019-03-15 LAB
ALBUMIN SERPL-MCNC: 3.9 G/DL (ref 3.3–4.6)
ALP SERPL-CCNC: 88 U/L (ref 40–150)
ALT SERPL-CCNC: 31 U/L (ref 0–45)
AST SERPL-CCNC: 32 U/L (ref 0–45)
BILIRUB SERPL-MCNC: 1.1 MG/DL (ref 0.2–1.3)
BUN SERPL-MCNC: 13 MG/DL (ref 7–30)
CALCIUM SERPL-MCNC: 10.1 MG/DL (ref 8.5–10.4)
CHLORIDE SERPLBLD-SCNC: 101 MMOL/L (ref 94–109)
CHOLEST SERPL-MCNC: 119 MG/DL
CHOLEST/HDLC SERPL: 2.6 RATIO
CO2 SERPL-SCNC: 28 MMOL/L (ref 20–32)
CREAT SERPL-MCNC: 0.5 MG/DL (ref 0.6–1.3)
EGFR CALCULATED (BLACK REFERENCE): >90 ML/MIN
EGFR CALCULATED (NON BLACK REFERENCE): >90 ML/MIN
GLUCOSE SERPL-MCNC: 135 MG/DL (ref 60–109)
HBA1C MFR BLD: 7.8 % (ref 4.1–5.7)
HCT VFR BLD AUTO: 42.8 % (ref 35–47)
HDLC SERPL-MCNC: 46 MG/DL
HEMOGLOBIN: 13.6 G/DL (ref 11.7–15.7)
HIV 1+2 AB+HIV1 P24 AG SERPL QL IA: NEGATIVE
LDLC SERPL CALC-MCNC: 47 MG/DL (ref 0–99)
MCH RBC QN AUTO: 29.1 PG (ref 26.5–35)
MCHC RBC AUTO-ENTMCNC: 31.8 G/DL (ref 32–36)
MCV RBC AUTO: 91.5 FL (ref 78–100)
PLATELET # BLD AUTO: 255 K/UL (ref 150–450)
POTASSIUM SERPL-SCNC: 4.2 MMOL/L (ref 3.4–5.3)
PROT SERPL-MCNC: 7.5 G/DL (ref 6.6–8.8)
RBC # BLD AUTO: 4.68 M/UL (ref 3.8–5.2)
SODIUM SERPL-SCNC: 141 MMOL/L (ref 133–144)
TRIGL SERPL-MCNC: 129 MG/DL
VLDL-CHOLESTEROL: 26 MG/DL (ref 7–32)
WBC # BLD AUTO: 6.5 K/UL (ref 4–11)

## 2019-03-15 ASSESSMENT — MIFFLIN-ST. JEOR: SCORE: 1017.47

## 2019-03-15 NOTE — PROGRESS NOTES
Chief Complaint   Patient presents with     Diabetes     DM Check     Medication Reconciliation     completed            HPI:       John Chun is a 53 year old  female with a significant past medical history of diabetes who presents for follow up of concern(s) listed below    1. Diabetes: Last A1C was 8.1%. Taking glipizide and metformin, fasting this AM was 140. Postprandial 280 (300s). Has not been to eye doctor, she does not want to come, used a salt in her eye and she can see well. No sores in her feet. No burning in feet. She has been working on her diet and is down 3 lbs from her last visit. She states she is too busy to eat because she looks after her grandkids.             PMHX:     Patient Active Problem List   Diagnosis     Type 2 diabetes mellitus without complication (HCC)     Benign essential hypertension     Hyperlipidemia LDL goal <100     MRSA infection     Cervical cancer screening       Current Outpatient Medications   Medication Sig Dispense Refill     atorvastatin (LIPITOR) 20 MG tablet Take 0.5 tablets (10 mg) by mouth daily For 1 week, then increase to 1 tab daily 90 tablet 3     blood glucose (NO BRAND SPECIFIED) lancets standard Use to test blood sugar 2 times daily or as directed. 1 Box 3     blood glucose monitoring (WISAM CONTOUR MONITOR) meter device kit Check sugars 2 times a day. May Substitute covered meter and supplies 1 kit 0     blood glucose monitoring (NO BRAND SPECIFIED) test strip Use to test blood sugars 2 times daily . Please fill with Contour Next test strips. 100 strip 3     lisinopril (PRINIVIL/ZESTRIL) 5 MG tablet Take 1 tablet (5 mg) by mouth daily 90 tablet 3     metFORMIN (GLUCOPHAGE) 1000 MG tablet Take 1 tablet (1,000 mg) by mouth 2 times daily (with meals) 180 tablet 1     aspirin (ASA) 81 MG tablet Take 1 tablet (81 mg) by mouth daily 90 tablet 3     glipiZIDE (GLIPIZIDE XL) 10 MG 24 hr tablet Take 1 tablet (10 mg) by mouth daily 90 tablet 1              Allergies  "  Allergen Reactions     No Known Drug Allergy        Results for orders placed or performed in visit on 03/15/19 (from the past 24 hour(s))   Hemoglobin A1c ([facility])   Result Value Ref Range    Hemoglobin A1C 7.8 (H) 4.1 - 5.7 %   Lipid Panel ([facility]) - Results < 1 hr   Result Value Ref Range    Cholesterol 119.0 <200.0 mg/dL    Triglycerides 129.0 <150.0 mg/dL    HDL Cholesterol 46.0 (L) >50.0 mg/dL    VLDL-Cholesterol 26.0 7.0 - 32.0 mg/dL    LDL Cholesterol Direct 47.0 0.0 - 99.0 mg/dL    Cholesterol/HDL Ratio 2.6 <5.0 RATIO   Comprehensive Metabolic Panel ([facility]) - results <1hr Protocol   Result Value Ref Range    Glucose 135.0 (H) 60.0 - 109.0 mg/dL    Urea Nitrogen 13.0 7.0 - 30.0 mg/dL    Creatinine 0.5 (L) 0.6 - 1.3 mg/dL    Sodium 141.0 133.0 - 144.0 mmol/L    Potassium 4.2 3.4 - 5.3 mmol/L    Chloride 101.0 94.0 - 109.0 mmol/L    Carbon Dioxide 28.0 20.0 - 32.0 mmol/L    Calcium 10.1 8.5 - 10.4 mg/dL    Protein Total 7.5 6.6 - 8.8 g/dL    Albumin 3.9 3.3 - 4.6 g/dL    Alkaline Phosphatase 88.0 40.0 - 150.0 U/L    ALT 31.0 0.0 - 45.0 U/L    AST 32.0 0.0 - 45.0 U/L    Bilirubin Total 1.1 0.2 - 1.3 mg/dL    eGFR Calculated (Non Black Reference) >90 >60.0 mL/min    eGFR Calculated (Black Reference) >90 >60.0 mL/min   CBC with Plt ([facility])   Result Value Ref Range    WBC 6.5 4.0 - 11.0 K/uL    RBC 4.68 3.80 - 5.20 M/uL    Hemoglobin 13.6 11.7 - 15.7 g/dL    Hematocrit 42.8 35.0 - 47.0 %    MCV 91.5 78.0 - 100.0 fL    MCH 29.1 26.5 - 35.0 pg    MCHC 31.8 (L) 32.0 - 36.0 g/dL    Platelets 255.0 150.0 - 450.0 K/uL              Physical Exam:     Vitals:    03/15/19 1027   BP: 109/76   Pulse: 85   Resp: 18   Temp: 97.6  F (36.4  C)   TempSrc: Oral   SpO2: 97%   Weight: 51.3 kg (113 lb 3.2 oz)   Height: 1.48 m (4' 10.27\")     Body mass index is 23.44 kg/m .    GENERAL APPEARANCE: healthy, alert and no distress,  RESP: lungs clear to auscultation - no rales, rhonchi or wheezes  CV: regular rate and rhythm,  and no " murmur, click,  rub or gallop  MS: extremities normal- no gross deformities noted      Assessment and Plan     1. Type 2 diabetes mellitus without complication, without long-term current use of insulin (H)  Continue to make healthy food choices  - Hemoglobin A1c ([facility])  - Comprehensive Metabolic Panel ([facility]) - results <1hr Protocol  - CBC with Plt ([facility])    2. Hyperlipidemia LDL goal <100  - Lipid Panel ([facility]) - Results < 1 hr    3. Benign essential hypertension  Continue on lisinopril    4. Screening for viral disease    - HIV Ag/Ab Screen [place] [facility]    5. Screening for condition    - MA SCREENING DIGITAL BILAT    She does not need medication refills today    Options for treatment and follow-up care were reviewed with the patient and/or guardian. John Chun and/or guardian engaged in the decision making process and verbalized understanding of the options discussed and agreed with the final plan.    Mary Almanza, DO

## 2019-03-22 ENCOUNTER — HOSPITAL ENCOUNTER (OUTPATIENT)
Dept: MAMMOGRAPHY | Facility: CLINIC | Age: 54
Discharge: HOME | End: 2019-03-22
Attending: FAMILY MEDICINE

## 2019-03-22 DIAGNOSIS — Z12.31 VISIT FOR SCREENING MAMMOGRAM: ICD-10-CM

## 2019-06-10 DIAGNOSIS — E11.9 TYPE 2 DIABETES MELLITUS WITHOUT COMPLICATION, WITHOUT LONG-TERM CURRENT USE OF INSULIN (H): ICD-10-CM

## 2019-11-22 ENCOUNTER — OFFICE VISIT (OUTPATIENT)
Dept: FAMILY MEDICINE | Facility: CLINIC | Age: 54
End: 2019-11-22
Payer: COMMERCIAL

## 2019-11-22 VITALS
RESPIRATION RATE: 18 BRPM | DIASTOLIC BLOOD PRESSURE: 81 MMHG | HEART RATE: 87 BPM | HEIGHT: 59 IN | BODY MASS INDEX: 23.47 KG/M2 | OXYGEN SATURATION: 98 % | SYSTOLIC BLOOD PRESSURE: 122 MMHG | TEMPERATURE: 97.7 F | WEIGHT: 116.4 LBS

## 2019-11-22 DIAGNOSIS — Z13.9 SCREENING FOR CONDITION: ICD-10-CM

## 2019-11-22 DIAGNOSIS — I10 BENIGN ESSENTIAL HYPERTENSION: ICD-10-CM

## 2019-11-22 DIAGNOSIS — Z23 NEED FOR PROPHYLACTIC VACCINATION AND INOCULATION AGAINST INFLUENZA: ICD-10-CM

## 2019-11-22 DIAGNOSIS — L72.9 CYST OF BUTTOCKS: ICD-10-CM

## 2019-11-22 DIAGNOSIS — E11.9 TYPE 2 DIABETES MELLITUS WITHOUT COMPLICATION, WITHOUT LONG-TERM CURRENT USE OF INSULIN (H): Primary | ICD-10-CM

## 2019-11-22 DIAGNOSIS — E78.5 HYPERLIPIDEMIA LDL GOAL <100: ICD-10-CM

## 2019-11-22 LAB
ALBUMIN SERPL-MCNC: 3.7 G/DL (ref 3.3–4.6)
ALP SERPL-CCNC: 76 U/L (ref 40–150)
ALT SERPL-CCNC: 43 U/L (ref 0–45)
AST SERPL-CCNC: 32 U/L (ref 0–45)
BILIRUB SERPL-MCNC: 0.8 MG/DL (ref 0.2–1.3)
BUN SERPL-MCNC: 17 MG/DL (ref 7–30)
CALCIUM SERPL-MCNC: 9.8 MG/DL (ref 8.5–10.4)
CHLORIDE SERPLBLD-SCNC: 103 MMOL/L (ref 94–109)
CO2 SERPL-SCNC: 28 MMOL/L (ref 20–32)
CREAT SERPL-MCNC: 0.8 MG/DL (ref 0.6–1.3)
CREAT UR-MCNC: 114.9 MG/DL
EGFR CALCULATED (BLACK REFERENCE): >90 ML/MIN
EGFR CALCULATED (NON BLACK REFERENCE): 79.7 ML/MIN
GLUCOSE SERPL-MCNC: 151 MG/DL (ref 60–109)
HBA1C MFR BLD: 8.2 % (ref 4.1–5.7)
MICROALBUMIN UR-MCNC: 4.2 MG/DL (ref 0–1.99)
MICROALBUMIN/CREAT UR: 36.6 MG/G
POTASSIUM SERPL-SCNC: 3.8 MMOL/L (ref 3.4–5.3)
PROT SERPL-MCNC: 7.6 G/DL (ref 6.6–8.8)
SODIUM SERPL-SCNC: 141 MMOL/L (ref 133–144)
TSH SERPL DL<=0.05 MIU/L-ACNC: 3.49 UIU/ML (ref 0.3–5)

## 2019-11-22 RX ORDER — ATORVASTATIN CALCIUM 20 MG/1
10 TABLET, FILM COATED ORAL DAILY
Qty: 90 TABLET | Refills: 3 | Status: SHIPPED | OUTPATIENT
Start: 2019-11-22 | End: 2020-02-06

## 2019-11-22 RX ORDER — GLIPIZIDE 10 MG/1
10 TABLET, FILM COATED, EXTENDED RELEASE ORAL DAILY
Qty: 90 TABLET | Refills: 1 | Status: SHIPPED | OUTPATIENT
Start: 2019-11-22 | End: 2020-06-15

## 2019-11-22 RX ORDER — LISINOPRIL 5 MG/1
5 TABLET ORAL DAILY
Qty: 90 TABLET | Refills: 3 | Status: SHIPPED | OUTPATIENT
Start: 2019-11-22 | End: 2020-07-30

## 2019-11-22 ASSESSMENT — MIFFLIN-ST. JEOR: SCORE: 1033.23

## 2019-11-22 NOTE — PROGRESS NOTES
Chief Complaint   Patient presents with     Diabetes     DM Check     Medication Reconciliation     not completed     Imm/Inj     Flu Shot            HPI:       John Chun is a 54 year old  female with a significant past medical history of diabetes who presents for follow up of concern(s) listed below    1. Diabetes: She feels her diabetes is under good control. She takes her medication daily. Has no eye issues. Fasting am: 120-135, as high as 150, after a meal can be in the high 200s. Her weight is stable.   Both sides, hears ringing, no hearing loss    Hemoglobin A1C   Date Value Ref Range Status   11/22/2019 8.2 (H) 4.1 - 5.7 % Final   03/15/2019 7.8 (H) 4.1 - 5.7 % Final   12/17/2018 8.1 (H) 4.1 - 5.7 % Final     2. Gluteal cyst: Needs to be drained. She has it drained about once a year. Not painful, but feels funny to her when sitting down.    3. Blood pressure is stable on medications. No chest pain or shortness of breath.       PMHX:     Patient Active Problem List   Diagnosis     Type 2 diabetes mellitus without complication (HCC)     Benign essential hypertension     Hyperlipidemia LDL goal <100     MRSA infection     Cervical cancer screening       Current Outpatient Medications   Medication Sig Dispense Refill     aspirin (ASA) 81 MG tablet Take 1 tablet (81 mg) by mouth daily 90 tablet 3     atorvastatin (LIPITOR) 20 MG tablet Take 0.5 tablets (10 mg) by mouth daily For 1 week, then increase to 1 tab daily 90 tablet 3     glipiZIDE (GLIPIZIDE XL) 10 MG 24 hr tablet Take 1 tablet (10 mg) by mouth daily 90 tablet 1     lisinopril (PRINIVIL/ZESTRIL) 5 MG tablet Take 1 tablet (5 mg) by mouth daily 90 tablet 3     metFORMIN (GLUCOPHAGE) 1000 MG tablet Take 1 tablet (1,000 mg) by mouth 2 times daily (with meals) 180 tablet 1     sitagliptin (JANUVIA) 25 MG tablet Take 1 tablet (25 mg) by mouth daily 90 tablet 1     blood glucose (NO BRAND SPECIFIED) lancets standard Use to test blood sugar 2 times daily or as  directed. 1 Box 3     blood glucose (NO BRAND SPECIFIED) test strip Use to test blood sugars 2 times daily . Please fill with Contour Next test strips. 100 strip 3     blood glucose monitoring (WISAM CONTOUR MONITOR) meter device kit Check sugars 2 times a day. May Substitute covered meter and supplies 1 kit 0       Social History     Socioeconomic History     Marital status: Unknown     Spouse name: Not on file     Number of children: Not on file     Years of education: Not on file     Highest education level: Not on file   Occupational History     Not on file   Social Needs     Financial resource strain: Not on file     Food insecurity:     Worry: Not on file     Inability: Not on file     Transportation needs:     Medical: Not on file     Non-medical: Not on file   Tobacco Use     Smoking status: Never Smoker     Smokeless tobacco: Never Used   Substance and Sexual Activity     Alcohol use: No     Drug use: No     Sexual activity: Never     Partners: Male   Lifestyle     Physical activity:     Days per week: Not on file     Minutes per session: Not on file     Stress: Not on file   Relationships     Social connections:     Talks on phone: Not on file     Gets together: Not on file     Attends Shinto service: Not on file     Active member of club or organization: Not on file     Attends meetings of clubs or organizations: Not on file     Relationship status: Not on file     Intimate partner violence:     Fear of current or ex partner: Not on file     Emotionally abused: Not on file     Physically abused: Not on file     Forced sexual activity: Not on file   Other Topics Concern     Parent/sibling w/ CABG, MI or angioplasty before 65F 55M? Not Asked   Social History Narrative     Not on file          Allergies   Allergen Reactions     No Known Drug Allergy        No results found for this or any previous visit (from the past 24 hour(s)).         Review of Systems:   C: NEGATIVE for fatigue, unexpected change in  "weight  E: NEGATIVE for acute vision problems or changes  R: NEGATIVE for significant cough or shortness of breath  CV: NEGATIVE for chest pain, palpitations or new or worsening peripheral edema          Physical Exam:     Vitals:    11/22/19 0829   BP: 122/81   Pulse: 87   Resp: 18   Temp: 97.7  F (36.5  C)   TempSrc: Oral   SpO2: 98%   Weight: 52.8 kg (116 lb 6.4 oz)   Height: 1.49 m (4' 10.66\")     Body mass index is 23.78 kg/m .    GENERAL APPEARANCE: healthy, alert and no distress,  EYES: Eyes grossly normal to inspection,  PERRL  HENT: ear canals and TM's normal and nose and mouth without ulcers or lesions  NECK: no adenopathy, no asymmetry, masses, or scars and thyroid normal to palpation  RESP: lungs clear to auscultation - no rales, rhonchi or wheezes  CV: regular rate and rhythm,  and no murmur, click,  rub or gallop  MS: extremities normal- no gross deformities noted. Normal monofilament exam  SKIN: Large cystic region of left buttock      Consent: Affirmation of informed consent was signed and scanned into the medical record. Risks, benefits and alternatives were discussed. Patient's questions were elicited and answered.   Procedure safety checklist was completed:  Yes  Time Out (Pause for the Cause) completed: Yes    Preoperative Diagnosis: Gluteal cyst   Location: Buttock and RIGHT   Size:  large  Postoperative Diagnosis: same    Technique:   Skin prep:     Alcohol wipe  Anesthesia 2 cc 1% lidocaine, without epi   Procedure An 18 gauge needle was inserted and thick gelatinous material was drained from the area  Hemostasis  Pressure      EBL:    minimal  Complications:  No  Tolerance:   Pt tolerated procedure well and was in stable condition.   Pathology sent No    Instructions:    Pt should keep dressing in place for 24 hours, then may change and apply antibiotic ointment and simple bandage. May shower after 24 hours.  Pt was instructed to call if bleeding, severe pain or foul smell.         Assessment " and Plan     1. Type 2 diabetes mellitus without complication, without long-term current use of insulin (H)  Declined eye referral, consider in the spring 2020.  - Hemoglobin A1c ([facility])  - Comprehensive Metabolic Panel ([facility]) - results <1hr Protocol  - TSH  Sensitive ([facility])  - C FOOT EXAM  NO CHARGE  - Microalbumin Creatinine Ratio Random Ur ([facility])  - aspirin (ASA) 81 MG tablet; Take 1 tablet (81 mg) by mouth daily  Dispense: 90 tablet; Refill: 3  - Continue metFORMIN (GLUCOPHAGE) 1000 MG tablet; Take 1 tablet (1,000 mg) by mouth 2 times daily (with meals)  Dispense: 180 tablet; Refill: 1  - Continue glipiZIDE (GLIPIZIDE XL) 10 MG 24 hr tablet; Take 1 tablet (10 mg) by mouth daily  Dispense: 90 tablet; Refill: 1    - Start sitagliptin (JANUVIA) 25 MG tablet; Take 1 tablet (25 mg) by mouth daily  Dispense: 90 tablet; Refill: 1  Patient was agreeable to starting medication after our discussion of risk vs. benefit and possible adverse side effects      2. Hyperlipidemia LDL goal <100    - atorvastatin (LIPITOR) 20 MG tablet; Take 0.5 tablets (10 mg) by mouth daily For 1 week, then increase to 1 tab daily  Dispense: 90 tablet; Refill: 3    3. Benign essential hypertension    - lisinopril (PRINIVIL/ZESTRIL) 5 MG tablet; Take 1 tablet (5 mg) by mouth daily  Dispense: 90 tablet; Refill: 3    4. Screening for condition    - Fecal Occult Blood - FIT, iFOB ([facility]); Future    5. Need for prophylactic vaccination and inoculation against influenza    - Fluzone quad, preserve-free/prefilled  0.5ml, 6+ months [00327]    6. Cyst of buttocks  Tolerated with no complication and good results  - DRAIN SKIN ABSCESS SIMPLE/SINGLE        Options for treatment and follow-up care were reviewed with the patient and/or guardian. John Chun and/or guardian engaged in the decision making process and verbalized understanding of the options discussed and agreed with the final plan.    Mary Almanza DO

## 2019-11-22 NOTE — LETTER
November 29, 2019      John Chun  [address] [place] [address]        Dear John,    Results are consistent with her diabetes. Her blood sugar and A1cwas slightly high and her kidneys have some evidence of the higher blood sugars. So, please start the new medication as discussed at clinic and we will see you soon for follow-up.     Please see below for your test results.    Resulted Orders   Hemoglobin A1c ([facility])   Result Value Ref Range    Hemoglobin A1C 8.2 (H) 4.1 - 5.7 %   Comprehensive Metabolic Panel ([facility]) - results <1hr Protocol   Result Value Ref Range    Glucose 151.0 (H) 60.0 - 109.0 mg/dL    Urea Nitrogen 17.0 7.0 - 30.0 mg/dL    Creatinine 0.8 0.6 - 1.3 mg/dL    Sodium 141.0 133.0 - 144.0 mmol/L    Potassium 3.8 3.4 - 5.3 mmol/L    Chloride 103.0 94.0 - 109.0 mmol/L    Carbon Dioxide 28.0 20.0 - 32.0 mmol/L    Calcium 9.8 8.5 - 10.4 mg/dL    Protein Total 7.6 6.6 - 8.8 g/dL    Albumin 3.7 3.3 - 4.6 g/dL    Alkaline Phosphatase 76.0 40.0 - 150.0 U/L    ALT 43.0 0.0 - 45.0 U/L    AST 32.0 0.0 - 45.0 U/L    Bilirubin Total 0.8 0.2 - 1.3 mg/dL    eGFR Calculated (Non Black Reference) 79.7 >60.0 mL/min    eGFR Calculated (Black Reference) >90 >60.0 mL/min   TSH  Sensitive ([facility])   Result Value Ref Range    TSH 3.49 0.30 - 5.00 uIU/mL    Narrative    Test performed by:  [facility]  [address] [place] [address]   Microalbumin Creatinine Ratio Random Ur ([facility])   Result Value Ref Range    Microalbumin, Urine 4.20 (H) 0.00 - 1.99 mg/dL    Creatinine, Urine 114.9 mg/dL    Albumin Urine mg/g Cr 36.6 (H) <=19.9 mg/g    Narrative    Test performed by:  [facility]  [address] [place] [address]  Microalbumin, Random Urine  <2.0 mg/dL . . . . . . . . Normal  3.0-30.0 mg/dL . . . . . . Microalbuminuria  >30.0 mg/dL . . . . . .  . Clinical Proteinuria  Microalbumin/Creatinine Ratio, Random Urine  <20 mg/g . . . . .. . . . Normal   mg/g . . . . . . .  Microalbuminuria  >300 mg/g . . . . . . . . Clinical Proteinuria       If you have any questions, please call the clinic to make an appointment.    Sincerely,    Mary Almanza, DO

## 2020-02-04 DIAGNOSIS — E78.5 HYPERLIPIDEMIA LDL GOAL <100: ICD-10-CM

## 2020-02-04 NOTE — TELEPHONE ENCOUNTER
Message to physician:     Date of last visit: 11/22/2019     Date of next visit if scheduled: Visit date not found       Last Comprehensive Metabolic Panel:  Sodium   Date Value Ref Range Status   11/22/2019 141.0 133.0 - 144.0 mmol/L Final     Potassium   Date Value Ref Range Status   11/22/2019 3.8 3.4 - 5.3 mmol/L Final     Chloride   Date Value Ref Range Status   11/22/2019 103.0 94.0 - 109.0 mmol/L Final     Carbon Dioxide   Date Value Ref Range Status   11/22/2019 28.0 20.0 - 32.0 mmol/L Final     Anion Gap   Date Value Ref Range Status   03/23/2016 13 3 - 14 mmol/L Final     Glucose   Date Value Ref Range Status   11/22/2019 151.0 (H) 60.0 - 109.0 mg/dL Final     Urea Nitrogen   Date Value Ref Range Status   11/22/2019 17.0 7.0 - 30.0 mg/dL Final     Creatinine   Date Value Ref Range Status   11/22/2019 0.8 0.6 - 1.3 mg/dL Final     GFR Estimate   Date Value Ref Range Status   01/30/2017 >60 >60 mL/min/1.73m2 Final     Calcium   Date Value Ref Range Status   11/22/2019 9.8 8.5 - 10.4 mg/dL Final       BP Readings from Last 3 Encounters:   11/22/19 122/81   03/15/19 109/76   12/17/18 132/79       Lab Results   Component Value Date    A1C 8.2 11/22/2019    A1C 7.8 03/15/2019    A1C 8.1 12/17/2018    A1C 7.1 06/08/2018    A1C 8.1 02/12/2018                Please complete refill and CLOSE ENCOUNTER.  Closing the encounter signifies the refill is complete.

## 2020-02-06 RX ORDER — ATORVASTATIN CALCIUM 20 MG/1
10 TABLET, FILM COATED ORAL DAILY
Qty: 90 TABLET | Refills: 3 | Status: SHIPPED | OUTPATIENT
Start: 2020-02-06 | End: 2020-07-30

## 2020-06-15 DIAGNOSIS — E11.9 TYPE 2 DIABETES MELLITUS WITHOUT COMPLICATION, WITHOUT LONG-TERM CURRENT USE OF INSULIN (H): ICD-10-CM

## 2020-06-15 RX ORDER — GLIPIZIDE 10 MG/1
10 TABLET, FILM COATED, EXTENDED RELEASE ORAL DAILY
Qty: 90 TABLET | Refills: 1 | Status: SHIPPED | OUTPATIENT
Start: 2020-06-15 | End: 2020-07-30

## 2020-07-30 ENCOUNTER — OFFICE VISIT (OUTPATIENT)
Dept: FAMILY MEDICINE | Facility: CLINIC | Age: 55
End: 2020-07-30
Payer: COMMERCIAL

## 2020-07-30 VITALS
SYSTOLIC BLOOD PRESSURE: 128 MMHG | BODY MASS INDEX: 23.63 KG/M2 | WEIGHT: 117.2 LBS | OXYGEN SATURATION: 99 % | TEMPERATURE: 97.3 F | DIASTOLIC BLOOD PRESSURE: 81 MMHG | RESPIRATION RATE: 16 BRPM | HEIGHT: 59 IN | HEART RATE: 79 BPM

## 2020-07-30 DIAGNOSIS — I10 BENIGN ESSENTIAL HYPERTENSION: ICD-10-CM

## 2020-07-30 DIAGNOSIS — L72.9 CYST OF BUTTOCKS: ICD-10-CM

## 2020-07-30 DIAGNOSIS — E11.9 TYPE 2 DIABETES MELLITUS WITHOUT COMPLICATION, WITHOUT LONG-TERM CURRENT USE OF INSULIN (H): Primary | ICD-10-CM

## 2020-07-30 DIAGNOSIS — E78.5 HYPERLIPIDEMIA LDL GOAL <100: ICD-10-CM

## 2020-07-30 LAB
ALBUMIN SERPL-MCNC: 4 G/DL (ref 3.3–4.6)
ALP SERPL-CCNC: 71 U/L (ref 40–150)
ALT SERPL-CCNC: 30 U/L (ref 0–45)
AST SERPL-CCNC: 39 U/L (ref 0–45)
BILIRUB SERPL-MCNC: 1.1 MG/DL (ref 0.2–1.3)
BUN SERPL-MCNC: 18 MG/DL (ref 7–30)
CALCIUM SERPL-MCNC: 9.5 MG/DL (ref 8.5–10.4)
CHLORIDE SERPLBLD-SCNC: 103 MMOL/L (ref 94–109)
CHOLEST SERPL-MCNC: 143 MG/DL
CHOLEST/HDLC SERPL: 3.2 RATIO
CO2 SERPL-SCNC: 28 MMOL/L (ref 20–32)
CREAT SERPL-MCNC: 0.8 MG/DL (ref 0.6–1.3)
EGFR CALCULATED (BLACK REFERENCE): >90 ML/MIN
EGFR CALCULATED (NON BLACK REFERENCE): 79.4 ML/MIN
GLUCOSE SERPL-MCNC: 155 MG/DL (ref 60–109)
HBA1C MFR BLD: 8.1 % (ref 4.1–5.7)
HCT VFR BLD AUTO: 45.2 % (ref 35–47)
HDLC SERPL-MCNC: 45 MG/DL
HEMOGLOBIN: 13.8 G/DL (ref 11.7–15.7)
LDLC SERPL CALC-MCNC: 56 MG/DL (ref 0–99)
MCH RBC QN AUTO: 28.9 PG (ref 26.5–35)
MCHC RBC AUTO-ENTMCNC: 30.5 G/DL (ref 32–36)
MCV RBC AUTO: 94.8 FL (ref 78–100)
PLATELET # BLD AUTO: 227 K/UL (ref 150–450)
POTASSIUM SERPL-SCNC: 4.1 MMOL/L (ref 3.4–5.3)
PROT SERPL-MCNC: 7.9 G/DL (ref 6.6–8.8)
RBC # BLD AUTO: 4.77 M/UL (ref 3.8–5.2)
SODIUM SERPL-SCNC: 141 MMOL/L (ref 133–144)
TRIGL SERPL-MCNC: 209 MG/DL
VLDL-CHOLESTEROL: 42 MG/DL (ref 7–32)
WBC # BLD AUTO: 5.6 K/UL (ref 4–11)

## 2020-07-30 RX ORDER — LISINOPRIL 5 MG/1
5 TABLET ORAL DAILY
Qty: 90 TABLET | Refills: 3 | Status: SHIPPED | OUTPATIENT
Start: 2020-07-30 | End: 2021-08-23

## 2020-07-30 RX ORDER — ATORVASTATIN CALCIUM 20 MG/1
10 TABLET, FILM COATED ORAL DAILY
Qty: 90 TABLET | Refills: 3 | Status: SHIPPED | OUTPATIENT
Start: 2020-07-30 | End: 2021-08-23

## 2020-07-30 RX ORDER — GLIPIZIDE 10 MG/1
10 TABLET, FILM COATED, EXTENDED RELEASE ORAL DAILY
Qty: 90 TABLET | Refills: 3 | Status: SHIPPED | OUTPATIENT
Start: 2020-07-30 | End: 2021-03-08

## 2020-07-30 ASSESSMENT — MIFFLIN-ST. JEOR: SCORE: 1038.12

## 2020-07-30 NOTE — LETTER
August 5, 2020      John Chun  [address] [place] [address]        Dear ,    We are writing to inform you of your test results.    Your test results fall within the expected range(s) or remain unchanged from previous results.  Please continue with current treatment plan. Test results indicate you may require additional follow up, see comment below.    Resulted Orders   Hemoglobin A1c ([facility])   Result Value Ref Range    Hemoglobin A1C 8.1 (H) 4.1 - 5.7 %   Lipid Panel ([facility]) - Results < 1 hr   Result Value Ref Range    Cholesterol 143.0 <200.0 mg/dL    Triglycerides 209.0 (H) <150.0 mg/dL    HDL Cholesterol 45.0 (L) >50.0 mg/dL      Comment:      If diabetic or CVD then reference range <100    VLDL-Cholesterol 42.0 (H) 7.0 - 32.0 mg/dL    LDL Cholesterol Direct 56.0 0.0 - 99.0 mg/dL    Cholesterol/HDL Ratio 3.2 <5.0 RATIO   Comprehensive Metabolic Panel ([facility]) - results <1hr Protocol   Result Value Ref Range    Glucose 155.0 (H) 60.0 - 109.0 mg/dL    Urea Nitrogen 18.0 7.0 - 30.0 mg/dL    Creatinine 0.8 0.6 - 1.3 mg/dL    Sodium 141.0 133.0 - 144.0 mmol/L    Potassium 4.1 3.4 - 5.3 mmol/L    Chloride 103.0 94.0 - 109.0 mmol/L    Carbon Dioxide 28.0 20.0 - 32.0 mmol/L    Calcium 9.5 8.5 - 10.4 mg/dL    Protein Total 7.9 6.6 - 8.8 g/dL    Albumin 4.0 3.3 - 4.6 g/dL    Alkaline Phosphatase 71.0 40.0 - 150.0 U/L    ALT 30.0 0.0 - 45.0 U/L    AST 39.0 0.0 - 45.0 U/L    Bilirubin Total 1.1 0.2 - 1.3 mg/dL    eGFR Calculated (Non Black Reference) 79.4 >60.0 mL/min    eGFR Calculated (Black Reference) >90 >60.0 mL/min   CBC with Plt ([facility])   Result Value Ref Range    WBC 5.6 4.0 - 11.0 K/uL    RBC 4.77 3.80 - 5.20 M/uL    Hemoglobin 13.8 11.7 - 15.7 g/dL    Hematocrit 45.2 35.0 - 47.0 %    MCV 94.8 78.0 - 100.0 fL    MCH 28.9 26.5 - 35.0 pg    MCHC 30.5 (L) 32.0 - 36.0 g/dL    Platelets 227.0 150.0 - 450.0 K/uL       If you have any questions or concerns, please call the clinic at the number listed  above.       Sincerely,        Mary Almanza, DO

## 2020-07-30 NOTE — PROGRESS NOTES
HPI:       John Chun is a 55 year old who presents today for follow up of diabetes mellitus and gluteal cyst.         1.  Diabetes: Patient notes no problems with current medications. Patient is currently taking her medications as listed below.  Patient is not having issues with low blood sugars. Blood sugars are usually running 140s in fasting am, highest was 160.. Blood sugar data is obtained from patient report. Patient is not exercising outside of work/usual daily activities - she is too busy! 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 Patient's diet is good, she has a friend with a garden and gets lots of fresh vegetables.. Patient eats two meals per day and feels that is adequate..       Last A1c: 11/2019 at 8.2%, A1c today 8.1%.     Medications: Metformin 1000mg BID, Januvia daily and glipizide daily    Statin: Yes , LDL (goal <70): Yes    Smoking:No    Aspirin (recomemended for ASCVD >10%): Yes    Blood pressure (goal <140/90): controlled, usually runs 120s/80s    Last eye exam: Unknown- but she wants to wait until threat from the pandemic is over    Microalbuminuria: Yes, last checked 11/2019     ACE-I if microalbuminuria: Yes      2. Health maintenance reviewed and appropriate orders placed?  No  Has a FIT test from 11/2019, she will try to find it at home    3. Gluteal cyst: Needs to be drained. She has it drained about once a year. Not painful, but feels funny to her when sitting down. She would like to have surgically removed in the near future.    4. Blood pressure is stable on medications. No chest pain or shortness of breath.    5. COVID19: Her DIL had chiu virus in 3/2020 (she worked at [employer]) but her DIL does not live with her, so she was not affected.     BP Readings from Last 3 Encounters:   07/30/20 128/81   11/22/19 122/81   03/15/19 109/76       Wt Readings from Last 3 Encounters:   07/30/20 53.2 kg (117 lb 3.2 oz)   11/22/19 52.8 kg (116 lb 6.4 oz)   03/15/19 51.3  kg (113 lb 3.2 oz)              PMHX:     Patient Active Problem List   Diagnosis     Type 2 diabetes mellitus without complication (HCC)     Benign essential hypertension     Hyperlipidemia LDL goal <100     MRSA infection     Cervical cancer screening       Current Outpatient Medications   Medication Sig Dispense Refill     aspirin (ASA) 81 MG tablet Take 1 tablet (81 mg) by mouth daily 90 tablet 3     atorvastatin (LIPITOR) 20 MG tablet Take 0.5 tablets (10 mg) by mouth daily For 1 week, then increase to 1 tab daily 90 tablet 3     blood glucose (NO BRAND SPECIFIED) lancets standard Use to test blood sugar 2 times daily or as directed. 1 Box 3     blood glucose (NO BRAND SPECIFIED) test strip Use to test blood sugars 2 times daily . Please fill with Contour Next test strips. 100 strip 3     blood glucose monitoring ([email or web address] CONTOUR MONITOR) meter device kit Check sugars 2 times a day. May Substitute covered meter and supplies 1 kit 0     glipiZIDE (GLIPIZIDE XL) 10 MG 24 hr tablet Take 1 tablet (10 mg) by mouth daily 90 tablet 1     lisinopril (PRINIVIL/ZESTRIL) 5 MG tablet Take 1 tablet (5 mg) by mouth daily 90 tablet 3     metFORMIN (GLUCOPHAGE) 1000 MG tablet Take 1 tablet (1,000 mg) by mouth 2 times daily (with meals) 180 tablet 1     sitagliptin (JANUVIA) 25 MG tablet Take 1 tablet (25 mg) by mouth daily 90 tablet 1       Social History     Socioeconomic History     Marital status: Unknown     Spouse name: Not on file     Number of children: Not on file     Years of education: Not on file     Highest education level: Not on file   Occupational History     Not on file   Social Needs     Financial resource strain: Not on file     Food insecurity     Worry: Not on file     Inability: Not on file     Transportation needs     Medical: Not on file     Non-medical: Not on file   Tobacco Use     Smoking status: Never Smoker     Smokeless tobacco: Never Used   Substance and Sexual Activity     Alcohol use: No     Drug  use: No     Sexual activity: Never     Partners: Male   Lifestyle     Physical activity     Days per week: Not on file     Minutes per session: Not on file     Stress: Not on file   Relationships     Social connections     Talks on phone: Not on file     Gets together: Not on file     Attends Spiritism service: Not on file     Active member of club or organization: Not on file     Attends meetings of clubs or organizations: Not on file     Relationship status: Not on file     Intimate partner violence     Fear of current or ex partner: Not on file     Emotionally abused: Not on file     Physically abused: Not on file     Forced sexual activity: Not on file   Other Topics Concern     Parent/sibling w/ CABG, MI or angioplasty before 65F 55M? Not Asked   Social History Narrative     Not on file       Allergies   Allergen Reactions     No Known Drug Allergy               Recent Labs:     Last A1C:   Lab Results   Component Value Date    A1C 8.2 11/22/2019    A1C 7.8 03/15/2019    A1C 8.1 12/17/2018     Recent Labs   Lab Test 03/15/19  1030 02/12/18  0915   CHOL 119.0 161.0   HDL 46.0* 55.0   LDL 47.0 60.0   TRIG 129.0 229.0*   CHOLHDLRATIO 2.6 2.9     Last Microalbumin: No results found for: MICROALBUMIN      The 10-year ASCVD risk score (Katelynluan AGUDELO Jr., et al., 2013) is: 3.9%    Values used to calculate the score:      Age: 55 years      Sex: Female      Is Non- : No      Diabetic: Yes      Tobacco smoker: No      Systolic Blood Pressure: 128 mmHg      Is BP treated: Yes      HDL Cholesterol: 45 mg/dL      Total Cholesterol: 143 mg/dL           Review of Systems:     C: NEGATIVE for fatigue, unexpected change in weight  E: NEGATIVE for acute vision problems or changes  R: NEGATIVE for significant cough or shortness of breath  CV: NEGATIVE for chest pain, palpitations or new or worsening peripheral edema  P: NEGATIVE for changes in mood or affect          Physical Exam:     Vitals: /81    "Pulse 79   Temp 97.3  F (36.3  C) (Oral)   Resp 16   Ht 1.5 m (4' 11.06\")   Wt 53.2 kg (117 lb 3.2 oz)   SpO2 99%   BMI 23.63 kg/m    BMIE= Body mass index is 23.63 kg/m .  GENERAL APPEARANCE: alert and no acute distress  PSYCH: mentation appears normal and affect normal/bright  RESP: lungs clear to auscultation - no rales, rhonchi or wheezes  CV: regular rate and rhythm, normal S1 S2, no S3 or S4 and no murmur, click or rub -  EXT: no cyanosis or edema in lower extremities  SKIN: Left gluteal region with enlarged cyst.     Results for orders placed or performed in visit on 07/30/20   Hemoglobin A1c ([facility])     Status: Abnormal   Result Value Ref Range    Hemoglobin A1C 8.1 (H) 4.1 - 5.7 %   Lipid Panel ([facility]) - Results < 1 hr     Status: Abnormal   Result Value Ref Range    Cholesterol 143.0 <200.0 mg/dL    Triglycerides 209.0 (H) <150.0 mg/dL    HDL Cholesterol 45.0 (L) >50.0 mg/dL    VLDL-Cholesterol 42.0 (H) 7.0 - 32.0 mg/dL    LDL Cholesterol Direct 56.0 0.0 - 99.0 mg/dL    Cholesterol/HDL Ratio 3.2 <5.0 RATIO   Comprehensive Metabolic Panel ([facility]) - results <1hr Protocol     Status: Abnormal   Result Value Ref Range    Glucose 155.0 (H) 60.0 - 109.0 mg/dL    Urea Nitrogen 18.0 7.0 - 30.0 mg/dL    Creatinine 0.8 0.6 - 1.3 mg/dL    Sodium 141.0 133.0 - 144.0 mmol/L    Potassium 4.1 3.4 - 5.3 mmol/L    Chloride 103.0 94.0 - 109.0 mmol/L    Carbon Dioxide 28.0 20.0 - 32.0 mmol/L    Calcium 9.5 8.5 - 10.4 mg/dL    Protein Total 7.9 6.6 - 8.8 g/dL    Albumin 4.0 3.3 - 4.6 g/dL    Alkaline Phosphatase 71.0 40.0 - 150.0 U/L    ALT 30.0 0.0 - 45.0 U/L    AST 39.0 0.0 - 45.0 U/L    Bilirubin Total 1.1 0.2 - 1.3 mg/dL    eGFR Calculated (Non Black Reference) 79.4 >60.0 mL/min    eGFR Calculated (Black Reference) >90 >60.0 mL/min   CBC with Plt ([facility])     Status: Abnormal   Result Value Ref Range    WBC 5.6 4.0 - 11.0 K/uL    RBC 4.77 3.80 - 5.20 M/uL    Hemoglobin 13.8 11.7 - 15.7 g/dL    Hematocrit 45.2 " 35.0 - 47.0 %    MCV 94.8 78.0 - 100.0 fL    MCH 28.9 26.5 - 35.0 pg    MCHC 30.5 (L) 32.0 - 36.0 g/dL    Platelets 227.0 150.0 - 450.0 K/uL         Skin Procedure Note    John Chun is a patient of Mary Woody here for left gluteal cyst    Consent:Risks, benefits and alternatives were discussed. Patient's questions were elicited and answered. Verbal informed consent was obtained.    Preoperative Diagnosis: Left gluteal loculated cystic lesion  Postoperative Diagnosis: same    Technique:   Skin prep Alcohol wipe  Anesthesia 3 cc 1% lidocaine, with epi   Procedure 18 gauge needle was placed and aspirated approximately 20cc of fluid, then more fluid was expressed with pressure  Hemostasis  Band-aid applied      EBL:    minimal  Complications:  No  Tolerance:   Pt tolerated procedure well and was in stable condition.   Pathology sent No    Instructions:    Pt should keep dressing in place for 24 hours, then may change and apply antibiotic ointment and simple bandage. May shower after 24 hours.  Pt was instructed to call if bleeding, severe pain or foul smell.         Assessment and Plan     John Chun is a 55 year old female here for evaluation of:     1. Type 2 diabetes mellitus without complication, without long-term current use of insulin (H)  Her A1C was above her goal, however she declined medication adjust at this time. Plan to work on her diet. She does not have time to exercise due to being too busy.  - Hemoglobin A1c ([facility])  - Lipid Panel ([facility]) - Results < 1 hr  - Comprehensive Metabolic Panel ([facility]) - results <1hr Protocol  - CBC with Plt ([facility])  - metFORMIN (GLUCOPHAGE) 1000 MG tablet; Take 1 tablet (1,000 mg) by mouth 2 times daily (with meals)  Dispense: 180 tablet; Refill: 3  - sitagliptin (JANUVIA) 25 MG tablet; Take 1 tablet (25 mg) by mouth daily  Dispense: 90 tablet; Refill: 3  - glipiZIDE (GLIPIZIDE XL) 10 MG 24 hr tablet; Take 1 tablet (10 mg) by mouth daily  Dispense: 90 tablet;  Refill: 3  - blood glucose (NO BRAND SPECIFIED) lancets standard; Use to test blood sugar 2 times daily or as directed.  Dispense: 100 each; Refill: 3  - blood glucose (NO BRAND SPECIFIED) test strip; Use to test blood sugars 2 times daily . Please fill with Contour Next test strips.  Dispense: 100 strip; Refill: 3    2. Benign essential hypertension    - lisinopril (ZESTRIL) 5 MG tablet; Take 1 tablet (5 mg) by mouth daily  Dispense: 90 tablet; Refill: 3    3. Cyst of buttocks    - DRAIN SKIN ABSCESS SIMPLE/SINGLE    4. Hyperlipidemia LDL goal <100    - atorvastatin (LIPITOR) 20 MG tablet; Take 0.5 tablets (10 mg) by mouth daily For 1 week, then increase to 1 tab daily  Dispense: 90 tablet; Refill: 3      Options for treatment and follow-up care were reviewed with the patient and/or guardian. John Chun and/or guardian engaged in the decision making process and verbalized understanding of the options discussed and agreed with the final plan.    Mary Almanza, DO

## 2020-07-31 RX ORDER — ASPIRIN 81 MG/1
81 TABLET, COATED ORAL DAILY
COMMUNITY
Start: 2020-01-04 | End: 2021-08-23

## 2020-12-01 ENCOUNTER — TELEPHONE (OUTPATIENT)
Dept: FAMILY MEDICINE | Facility: CLINIC | Age: 55
End: 2020-12-01

## 2020-12-01 NOTE — TELEPHONE ENCOUNTER
----- Message from Mary Almanza, DO sent at 12/1/2020 12:20 PM CST -----  Regarding: appt  Can you reach out to see if she would make an appointment with me for her diabetes? She is due for her A1C.  Thanks

## 2021-03-08 ENCOUNTER — OFFICE VISIT (OUTPATIENT)
Dept: FAMILY MEDICINE | Facility: CLINIC | Age: 56
End: 2021-03-08
Payer: COMMERCIAL

## 2021-03-08 VITALS
BODY MASS INDEX: 23.43 KG/M2 | OXYGEN SATURATION: 100 % | HEART RATE: 87 BPM | WEIGHT: 116.2 LBS | TEMPERATURE: 97 F | HEIGHT: 59 IN | SYSTOLIC BLOOD PRESSURE: 122 MMHG | DIASTOLIC BLOOD PRESSURE: 82 MMHG

## 2021-03-08 DIAGNOSIS — I10 BENIGN ESSENTIAL HYPERTENSION: ICD-10-CM

## 2021-03-08 DIAGNOSIS — L72.9 CYST OF BUTTOCKS: ICD-10-CM

## 2021-03-08 DIAGNOSIS — E11.9 TYPE 2 DIABETES MELLITUS WITHOUT COMPLICATION, WITHOUT LONG-TERM CURRENT USE OF INSULIN (H): Primary | ICD-10-CM

## 2021-03-08 LAB
ALBUMIN SERPL-MCNC: 4.2 G/DL (ref 3.3–4.6)
ALP SERPL-CCNC: 78 U/L (ref 40–150)
ALT SERPL-CCNC: 31 U/L (ref 0–45)
AST SERPL-CCNC: 36 U/L (ref 0–45)
BILIRUB SERPL-MCNC: 0.8 MG/DL (ref 0.2–1.3)
BUN SERPL-MCNC: 18 MG/DL (ref 7–30)
CALCIUM SERPL-MCNC: 10 MG/DL (ref 8.5–10.4)
CHLORIDE SERPLBLD-SCNC: 102 MMOL/L (ref 94–109)
CO2 SERPL-SCNC: 27 MMOL/L (ref 20–32)
CREAT SERPL-MCNC: 0.6 MG/DL (ref 0.6–1.3)
CREAT UR-MCNC: 85.9 MG/DL
EGFR CALCULATED (BLACK REFERENCE): >90 ML/MIN
EGFR CALCULATED (NON BLACK REFERENCE): >90 ML/MIN
GLUCOSE SERPL-MCNC: 163 MG/DL (ref 60–109)
HBA1C MFR BLD: 7.7 % (ref 4.1–5.7)
MICROALBUMIN UR-MCNC: 2.52 MG/DL (ref 0–1.99)
MICROALBUMIN/CREAT UR: 29.3 MG/G
POTASSIUM SERPL-SCNC: 4.9 MMOL/L (ref 3.4–5.3)
PROT SERPL-MCNC: 8.2 G/DL (ref 6.6–8.8)
SODIUM SERPL-SCNC: 141 MMOL/L (ref 133–144)

## 2021-03-08 PROCEDURE — 99207 PR FOOT EXAM NO CHARGE: CPT | Performed by: FAMILY MEDICINE

## 2021-03-08 PROCEDURE — 99214 OFFICE O/P EST MOD 30 MIN: CPT | Mod: 25 | Performed by: FAMILY MEDICINE

## 2021-03-08 PROCEDURE — 83036 HEMOGLOBIN GLYCOSYLATED A1C: CPT | Performed by: FAMILY MEDICINE

## 2021-03-08 PROCEDURE — 36415 COLL VENOUS BLD VENIPUNCTURE: CPT | Performed by: FAMILY MEDICINE

## 2021-03-08 PROCEDURE — 10060 I&D ABSCESS SIMPLE/SINGLE: CPT | Performed by: FAMILY MEDICINE

## 2021-03-08 PROCEDURE — 80053 COMPREHEN METABOLIC PANEL: CPT | Performed by: FAMILY MEDICINE

## 2021-03-08 RX ORDER — GLIPIZIDE 10 MG/1
10 TABLET, FILM COATED, EXTENDED RELEASE ORAL DAILY
Qty: 90 TABLET | Refills: 3 | Status: SHIPPED | OUTPATIENT
Start: 2021-03-08 | End: 2021-08-23

## 2021-03-08 ASSESSMENT — MIFFLIN-ST. JEOR: SCORE: 1033.58

## 2021-03-08 NOTE — PROGRESS NOTES
Assessment & Plan     Type 2 diabetes mellitus without complication, without long-term current use of insulin (H)  Continue medication regimen  - Hepatitis C Antibody ([facility])  - Hemoglobin A1c ([facility])  - Comprehensive Metabolic Panel ([facility]) - results <1hr Protocol  - HI FOOT EXAM NO CHARGE  - Microalbumin Creatinine Ratio Random Ur ([facility])  - glipiZIDE (GLIPIZIDE XL) 10 MG 24 hr tablet; Take 1 tablet (10 mg) by mouth daily    Benign essential hypertension  Continue on lisinopril    Cyst of buttocks  - DRAIN SKIN ABSCESS SIMPLE/SINGLE    She plans to return the FIT test later this week and schedule mammogram in the fall of 2021.                 Return in about 21 weeks (around 8/2/2021) for DM recheck. and schedule mammogram from fall of 2021    DO PRERNA Braden [facility]    Sonya Lopez is a 55 year old who presents for the following health issues     HPI     1.  Diabetes: Patient notes no problems with current medications. Patient is currently taking her medications as listed below.  Patient is not having issues with low blood sugars. She does not check her blood sugars at home. Patient is not exercising outside of work/usual daily activities - But she does take care of 5 grandchildren and that keeps her very busy. Patient's diet is regular. Patient is eating three meals per day. Patient is not counting carbohydrates.      Last A1c 8.1% in 7/2020. A1c today 7.7%. A1c goal < 7%.    Medications: Metformin, glipizide, januvia    Statin: Atorvastatin , LDL (goal <70): Yes, 56    Smoking:No    Aspirin (recomemended for ASCVD >10%): Yes    Blood pressure (goal <140/90): controlled    Last eye exam: unknown- has been a long time, but she does not feel she has any vision issues    Microalbuminuria: Yes, last checked 11/2019     ACE-I if microalbuminuria: Yes, lisinopril    BP Readings from Last 3 Encounters:   03/08/21 122/82   07/30/20 128/81   11/22/19 122/81       Wt  "Readings from Last 3 Encounters:   03/08/21 52.7 kg (116 lb 3.2 oz)   07/30/20 53.2 kg (117 lb 3.2 oz)   11/22/19 52.8 kg (116 lb 6.4 oz)     2. Cyst on left buttocks. Needs to be drained once a year. Is much smaller today. Ia able to feel it when she sits down.    3.  Health maintenance reviewed:  She still has a FIT kit at home  Plans for mammogram in fall 2021      Objective    /82   Pulse 87   Temp 97  F (36.1  C) (Oral)   Ht 1.5 m (4' 11.06\")   Wt 52.7 kg (116 lb 3.2 oz)   SpO2 100%   BMI 23.43 kg/m    Body mass index is 23.43 kg/m .  Physical Exam   GENERAL: healthy, alert and no distress  EYES: Eyes grossly normal to inspection, PERRL and conjunctivae and sclerae normal  NECK: no adenopathy, no asymmetry, masses, or scars and thyroid normal to palpation  RESP: lungs clear to auscultation - no rales, rhonchi or wheezes  CV: regular rate and rhythm, normal S1 S2, no S3 or S4, no murmur, click or rub, no peripheral edema and peripheral pulses strong  ABDOMEN: soft, nontender, no hepatosplenomegaly, no masses and bowel sounds normal  MS: no gross musculoskeletal defects noted, no edema  SKIN: Left buttock with circular, fluctuant area at gluteal crease  Diabetic foot exam: normal DP and PT pulses, no trophic changes or ulcerative lesions and normal sensory exam    Procedure Note  Consent:Risks, benefits and alternatives were discussed. Patient's questions were elicited and answered. Verbal informed consent was obtained.     Preoperative Diagnosis: Left gluteal loculated cystic lesion  Postoperative Diagnosis: same     Technique:   Skin prep         Alcohol wipe  Anesthesia      3 cc 1% lidocaine, with epi   Procedure       18 gauge needle was placed and aspirated approximately 20cc of fluid, then more fluid was expressed with pressure  Hemostasis     Band-aid applied        EBL:                            minimal  Complications:            No  Tolerance:                   Pt tolerated procedure well " and was in stable condition.   Pathology sent            No      Results for orders placed or performed in visit on 03/08/21   Hepatitis C Antibody ([facility])     Status: None   Result Value Ref Range    Hepatitis C Antibody Screen Negative Negative    Narrative    Test performed by:  [facility]  [address] [place] [address]   Hemoglobin A1c ([facility])     Status: Abnormal   Result Value Ref Range    Hemoglobin A1C 7.7 (H) 4.1 - 5.7 %   Comprehensive Metabolic Panel ([facility]) - results <1hr Protocol     Status: Abnormal   Result Value Ref Range    Glucose 163.0 (H) 60.0 - 109.0 mg/dL    Urea Nitrogen 18.0 7.0 - 30.0 mg/dL    Creatinine 0.6 0.6 - 1.3 mg/dL    Sodium 141.0 133.0 - 144.0 mmol/L    Potassium 4.9 3.4 - 5.3 mmol/L    Chloride 102.0 94.0 - 109.0 mmol/L    Carbon Dioxide 27.0 20.0 - 32.0 mmol/L    Calcium 10.0 8.5 - 10.4 mg/dL    Protein Total 8.2 6.6 - 8.8 g/dL    Albumin 4.2 3.3 - 4.6 g/dL    Alkaline Phosphatase 78.0 40.0 - 150.0 U/L    ALT 31.0 0.0 - 45.0 U/L    AST 36.0 0.0 - 45.0 U/L    Bilirubin Total 0.8 0.2 - 1.3 mg/dL    eGFR Calculated (Non Black Reference) >90 >60.0 mL/min    eGFR Calculated (Black Reference) >90 >60.0 mL/min   Microalbumin Creatinine Ratio Random Ur ([facility])     Status: Abnormal   Result Value Ref Range    Microalbumin, Urine 2.52 (H) 0.00 - 1.99 mg/dL    Creatinine, Urine 85.9 mg/dL    Albumin Urine mg/g Cr 29.3 (H) <=19.9 mg/g    Narrative    Test [facility]  [facility]  [address] [place] [address]  Microalbumin, Random Urine  <2.0 mg/dL . . . . . . . . Normal  3.0-30.0 mg/dL . . . . . . Microalbuminuria  >30.0 mg/dL . . . . . .  . Clinical Proteinuria  Microalbumin/Creatinine Ratio, Random Urine  <20 mg/g . . . . .. . . . Normal   mg/g . . . . . . . Microalbuminuria  >300 mg/g . . . . . . . . Clinical Proteinuria

## 2021-03-08 NOTE — LETTER
March 12, 2021      John Chun  [address] [place] [address]        Dear ,    We are writing to inform you of your test results.      Resulted Orders   Hepatitis C Antibody ([x+1])   Result Value Ref Range    Hepatitis C Antibody Screen Negative Negative    Narrative    Test performed by:  [facility]  [address] [place] [address]   Hemoglobin A1c ([facility])   Result Value Ref Range    Hemoglobin A1C 7.7 (H) 4.1 - 5.7 %   Comprehensive Metabolic Panel ([facility]) - results <1hr Protocol   Result Value Ref Range    Glucose 163.0 (H) 60.0 - 109.0 mg/dL    Urea Nitrogen 18.0 7.0 - 30.0 mg/dL    Creatinine 0.6 0.6 - 1.3 mg/dL    Sodium 141.0 133.0 - 144.0 mmol/L    Potassium 4.9 3.4 - 5.3 mmol/L    Chloride 102.0 94.0 - 109.0 mmol/L    Carbon Dioxide 27.0 20.0 - 32.0 mmol/L    Calcium 10.0 8.5 - 10.4 mg/dL    Protein Total 8.2 6.6 - 8.8 g/dL    Albumin 4.2 3.3 - 4.6 g/dL    Alkaline Phosphatase 78.0 40.0 - 150.0 U/L    ALT 31.0 0.0 - 45.0 U/L    AST 36.0 0.0 - 45.0 U/L    Bilirubin Total 0.8 0.2 - 1.3 mg/dL    eGFR Calculated (Non Black Reference) >90 >60.0 mL/min    eGFR Calculated (Black Reference) >90 >60.0 mL/min   Microalbumin Creatinine [facility] Ur [facility]   Result Value Ref Range    Microalbumin, Urine 2.52 (H) 0.00 - 1.99 mg/dL    Creatinine, Urine 85.9 mg/dL    Albumin Urine mg/g Cr 29.3 (H) <=19.9 mg/g    Narrative    Test performed by:  [facility]  [address] [place] [address]  Microalbumin, Random Urine  <2.0 mg/dL . . . . . . . . Normal  3.0-30.0 mg/dL . . . . . . Microalbuminuria  >30.0 mg/dL . . . . . .  . Clinical Proteinuria  Microalbumin/Creatinine Ratio, Random Urine  <20 mg/g . . . . .. . . . Normal   mg/g . . . . . . . Microalbuminuria  >300 mg/g . . . . . . . . Clinical Proteinuria       If you have any questions or concerns, please call the clinic at the number listed above.        Sincerely,      Mary Almanza, DO

## 2021-03-09 LAB — HCV AB SER QL: NEGATIVE

## 2021-05-27 ENCOUNTER — RECORDS - HEALTHEAST (OUTPATIENT)
Dept: ADMINISTRATIVE | Facility: CLINIC | Age: 56
End: 2021-05-27

## 2021-06-13 NOTE — PROGRESS NOTES
HPI: John Chun is a 52 y.o. female referred to see me by Raiza Plasencia MD for a lump in the left gluteal region.  She notes  a several year history of the lump possibly 10 years, denies any trauma and states that the lump is slightly enlarged and causing discomfort when she is sitting.  She underwent an MRI which demonstrated a loculated cystic lesion.  Denies any nausea, vomiting, fevers, chills or any other symptoms at present.       Allergies:Review of patient's allergies indicates no known allergies.    No past medical history on file.    No past surgical history on file.    CURRENT MEDS:    Current Outpatient Prescriptions:      atorvastatin (LIPITOR) 20 MG tablet, , Disp: , Rfl: 3     CONTOUR NEXT STRIPS strips, U TO TEST BLOOD SUGARS BID OR UTD, Disp: , Rfl: 3     lisinopril (PRINIVIL,ZESTRIL) 5 MG tablet, TK 1 T PO D, Disp: , Rfl: 3     metFORMIN (GLUCOPHAGE) 1000 MG tablet, TK 1 T PO BID WITH MEALS, Disp: , Rfl: 3     naproxen (NAPROSYN) 500 MG tablet, TK 1 T PO BID WITH MEALS, Disp: , Rfl: 1    Family History   Problem Relation Age of Onset     Breast cancer Neg Hx             Review of Systems:  The 12 system review is within normal limits except for as mentioned above.  General ROS: No complaints or constitutional symptoms  Ophthalmic ROS: No complaints of visual changes  Skin: As per HPI  Endocrine: No complaints or symptoms  Hematologic/Lymphatic: No symptoms or complaints  Psychiatric: No symptoms or complaints  Respiratory ROS: no cough, shortness of breath, or wheezing  Cardiovascular ROS: no chest pain or dyspnea on exertion  Gastrointestinal ROS: No complaints of pain or other symptoms  Genito-Urinary ROS: no dysuria, trouble voiding, or hematuria  Musculoskeletal ROS: no joint or muscle pain  Neurological ROS: no TIA or stroke symptoms      EXAM:  /71 (Patient Site: Right Arm, Patient Position: Sitting, Cuff Size: Adult Regular)  Pulse 85  SpO2 97%  GENERAL: Well developed female, No  acute distress, pleasant and conversant   EYES: Pupils equal, round and reactive, no scleral icterus  CARDIAC: Regular rate and rhythm, no obvious murmurs noted  CHEST/LUNG: Clear to ascultation bilaterally, No ronchi, No wheezes  ABDOMEN: Soft, non tender, no masses  SKIN: Pink, warm and dry  Gluteal region-left fluctuant gluteal mass at the inferior gluteal fold, approximately 10 years in diameter  NEURO:No focal deficits, ambulatory  MUSCULOSKELETAL:No obvious deformities, no swelling, normal appearing      LABS:  No results found for: WBC, HGB, HCT, MCV, PLT  INR/Prothrombin Time      No results found for: ALT, AST, GGT, ALKPHOS, BILITOT    IMAGES:   Relevant images were reviewed and discussed with the patient.  Notable findings were:   MRI reviewed and consistent with cystic lesion    Procedure: After informed consent was obtained the left gluteal region was prepped and draped sterile fashion.  Quarter percent Marcaine was injected in the subcutaneous layers and an 18-gauge needle was used to aspirate approximately 110 cc of dark necrotic appearing liquid.  This was sent off the field as specimen.  Patient tolerated procedure well.  Wound was cleaned and covered dry sterile dressing.    Assessment/Plan:   John Chun is a 52 y.o. female with a large fluid collection left gluteal region.  This was drained successfully and appeared to have chronic liquefied fatty tissue.  This was sent off the field for cultures as a precaution.  She tolerated procedure well and may now follow up on a as needed basis..       Ricky Martin D.O., FACS  [phone]  [facility] Department of Surgery

## 2021-07-13 ENCOUNTER — RECORDS - HEALTHEAST (OUTPATIENT)
Dept: ADMINISTRATIVE | Facility: CLINIC | Age: 56
End: 2021-07-13

## 2021-08-23 ENCOUNTER — OFFICE VISIT (OUTPATIENT)
Dept: FAMILY MEDICINE | Facility: CLINIC | Age: 56
End: 2021-08-23
Payer: COMMERCIAL

## 2021-08-23 VITALS
HEIGHT: 59 IN | WEIGHT: 117 LBS | RESPIRATION RATE: 16 BRPM | TEMPERATURE: 98 F | HEART RATE: 76 BPM | OXYGEN SATURATION: 97 % | SYSTOLIC BLOOD PRESSURE: 127 MMHG | BODY MASS INDEX: 23.59 KG/M2 | DIASTOLIC BLOOD PRESSURE: 85 MMHG

## 2021-08-23 DIAGNOSIS — Z12.11 SPECIAL SCREENING FOR MALIGNANT NEOPLASMS, COLON: ICD-10-CM

## 2021-08-23 DIAGNOSIS — Z12.31 ENCOUNTER FOR SCREENING MAMMOGRAM FOR BREAST CANCER: ICD-10-CM

## 2021-08-23 DIAGNOSIS — E11.9 TYPE 2 DIABETES MELLITUS WITHOUT COMPLICATION, WITHOUT LONG-TERM CURRENT USE OF INSULIN (H): Primary | ICD-10-CM

## 2021-08-23 DIAGNOSIS — E78.5 HYPERLIPIDEMIA LDL GOAL <100: ICD-10-CM

## 2021-08-23 DIAGNOSIS — L72.9 CYST OF BUTTOCKS: ICD-10-CM

## 2021-08-23 DIAGNOSIS — I10 BENIGN ESSENTIAL HYPERTENSION: ICD-10-CM

## 2021-08-23 LAB
ALBUMIN SERPL-MCNC: 4.2 G/DL (ref 3.5–5)
ALP SERPL-CCNC: 75 U/L (ref 45–120)
ALT SERPL W P-5'-P-CCNC: 29 U/L (ref 0–45)
ANION GAP SERPL CALCULATED.3IONS-SCNC: 13 MMOL/L (ref 5–18)
AST SERPL W P-5'-P-CCNC: 25 U/L (ref 0–40)
BILIRUB SERPL-MCNC: 0.7 MG/DL (ref 0–1)
BUN SERPL-MCNC: 20 MG/DL (ref 8–22)
CALCIUM SERPL-MCNC: 10 MG/DL (ref 8.5–10.5)
CHLORIDE BLD-SCNC: 103 MMOL/L (ref 98–107)
CHOLEST SERPL-MCNC: 139 MG/DL
CO2 SERPL-SCNC: 24 MMOL/L (ref 22–31)
CREAT SERPL-MCNC: 0.76 MG/DL (ref 0.6–1.1)
ERYTHROCYTE [DISTWIDTH] IN BLOOD BY AUTOMATED COUNT: 11.6 % (ref 10–15)
FASTING STATUS PATIENT QL REPORTED: YES
GFR SERPL CREATININE-BSD FRML MDRD: 89 ML/MIN/1.73M2
GLUCOSE BLD-MCNC: 105 MG/DL (ref 70–125)
HBA1C MFR BLD: 7.3 % (ref 0–5.6)
HCT VFR BLD AUTO: 42.9 % (ref 35–47)
HDLC SERPL-MCNC: 51 MG/DL
HGB BLD-MCNC: 14.3 G/DL (ref 11.7–15.7)
LDLC SERPL CALC-MCNC: 50 MG/DL
MCH RBC QN AUTO: 29.1 PG (ref 26.5–33)
MCHC RBC AUTO-ENTMCNC: 33.3 G/DL (ref 31.5–36.5)
MCV RBC AUTO: 87 FL (ref 78–100)
PLATELET # BLD AUTO: 241 10E3/UL (ref 150–450)
POTASSIUM BLD-SCNC: 3.7 MMOL/L (ref 3.5–5)
PROT SERPL-MCNC: 8.2 G/DL (ref 6–8)
RBC # BLD AUTO: 4.91 10E6/UL (ref 3.8–5.2)
SODIUM SERPL-SCNC: 140 MMOL/L (ref 136–145)
TRIGL SERPL-MCNC: 192 MG/DL
WBC # BLD AUTO: 8.7 10E3/UL (ref 4–11)

## 2021-08-23 PROCEDURE — 83036 HEMOGLOBIN GLYCOSYLATED A1C: CPT | Performed by: FAMILY MEDICINE

## 2021-08-23 PROCEDURE — 36415 COLL VENOUS BLD VENIPUNCTURE: CPT | Performed by: FAMILY MEDICINE

## 2021-08-23 PROCEDURE — 80061 LIPID PANEL: CPT | Performed by: FAMILY MEDICINE

## 2021-08-23 PROCEDURE — 85027 COMPLETE CBC AUTOMATED: CPT | Performed by: FAMILY MEDICINE

## 2021-08-23 PROCEDURE — 10060 I&D ABSCESS SIMPLE/SINGLE: CPT | Performed by: FAMILY MEDICINE

## 2021-08-23 PROCEDURE — 99214 OFFICE O/P EST MOD 30 MIN: CPT | Mod: 25 | Performed by: FAMILY MEDICINE

## 2021-08-23 PROCEDURE — 80053 COMPREHEN METABOLIC PANEL: CPT | Performed by: FAMILY MEDICINE

## 2021-08-23 RX ORDER — LISINOPRIL 5 MG/1
5 TABLET ORAL DAILY
Qty: 90 TABLET | Refills: 3 | Status: SHIPPED | OUTPATIENT
Start: 2021-08-23 | End: 2022-09-16

## 2021-08-23 RX ORDER — ASPIRIN 81 MG/1
81 TABLET, COATED ORAL DAILY
COMMUNITY
Start: 2021-08-23 | End: 2022-09-16

## 2021-08-23 RX ORDER — ATORVASTATIN CALCIUM 20 MG/1
10 TABLET, FILM COATED ORAL DAILY
Qty: 90 TABLET | Refills: 3 | Status: SHIPPED | OUTPATIENT
Start: 2021-08-23 | End: 2022-09-16

## 2021-08-23 RX ORDER — GLIPIZIDE 10 MG/1
10 TABLET, FILM COATED, EXTENDED RELEASE ORAL DAILY
Qty: 90 TABLET | Refills: 3 | Status: SHIPPED | OUTPATIENT
Start: 2021-08-23 | End: 2022-09-16

## 2021-08-23 ASSESSMENT — MIFFLIN-ST. JEOR: SCORE: 1025.95

## 2021-08-23 NOTE — PROGRESS NOTES
Assessment & Plan     Type 2 diabetes mellitus without complication, without long-term current use of insulin (H)  No changes in medication today. Work on finding time to exercise.  - Hemoglobin A1c; Future  - Lipid Profile; Future  - Comprehensive metabolic panel; Future  - CBC with platelets; Future  - Hemoglobin A1c  - Lipid Profile  - Comprehensive metabolic panel  - CBC with platelets  - metFORMIN (GLUCOPHAGE) 1000 MG tablet; Take 1 tablet (1,000 mg) by mouth 2 times daily (with meals)  - sitagliptin (JANUVIA) 25 MG tablet; Take 1 tablet (25 mg) by mouth daily  - ASPIRIN LOW DOSE 81 MG EC tablet; Take 1 tablet (81 mg) by mouth daily  - glipiZIDE (GLIPIZIDE XL) 10 MG 24 hr tablet; Take 1 tablet (10 mg) by mouth daily  - blood glucose (NO BRAND SPECIFIED) lancets standard; Use to test blood sugar 2 times daily or as directed.  - blood glucose (NO BRAND SPECIFIED) test strip; Use to test blood sugars 2 times daily . Please fill with [employer] test strips.  - Adult Eye Referral; Future    Hyperlipidemia LDL goal <100  - atorvastatin (LIPITOR) 20 MG tablet; Take 0.5 tablets (10 mg) by mouth daily For 1 week, then increase to 1 tab daily    Benign essential hypertension  - lisinopril (ZESTRIL) 5 MG tablet; Take 1 tablet (5 mg) by mouth daily    Encounter for screening mammogram for breast cancer  - MA SCREENING DIGITAL BILAT; Future    Special screening for malignant neoplasms, colon  - Fecal colorectal cancer screen FIT; Future    Cyst of buttocks  Tolerated procedure well  - DRAIN SKIN ABSCESS SIMPLE/SINGLE                 Return in about 6 months (around 2/23/2022) for DM recheck.    Mary Almanza DO  [facility] [place]    Sonya Lopez is a 56 year old who presents for the following health issues     HPI     1.  Diabetes: Patient notes no problems with current medications. Patient is currently taking her medications as listed below.  Patient is not having issues with low blood  "sugars. Blood sugars are usually running 120-130. Blood sugar data is obtained from patient report. Patient is not exercising outside of work/usual daily activities - minimal exercise. Patient's diet is stable.       Last A1c 7.7%. A1c today 7.3%. A1c goal <7.0% .    Medications: Metformin, Januvia, glipizide    Statin: atorvastatin , LDL (goal <70): Yes    Smoking:No    Aspirin (recomemended for ASCVD >10%): Yes    Blood pressure (goal <140/90): controlled    Last eye exam Never     Microalbuminuria: No, last checked 3/2021     ACE-I if microalbuminuria: Yes    Last A1C:   Lab Results   Component Value Date    A1C 7.7 03/08/2021    A1C 8.1 07/30/2020    A1C 8.2 11/22/2019         2. Cyst on left buttocks. Needs to be drained once a year. Is much smaller today. Is able to feel it when she sits down.     3.  Health maintenance reviewed:  She still has a FIT kit at home  Plans for mammogram in fall 2021        Objective    /85 (BP Location: Left arm, Patient Position: Sitting, Cuff Size: Adult Regular)   Pulse 76   Temp 98  F (36.7  C) (Oral)   Resp 16   Ht 1.49 m (4' 10.66\")   Wt 53.1 kg (117 lb)   SpO2 97%   BMI 23.91 kg/m    Body mass index is 23.91 kg/m .  Physical Exam   GENERAL: healthy, alert and no distress  NECK: no adenopathy, no asymmetry, masses, or scars and thyroid normal to palpation  RESP: lungs clear to auscultation - no rales, rhonchi or wheezes  CV: regular rate and rhythm, normal S1 S2, no S3 or S4, no murmur, click or rub, no peripheral edema and peripheral pulses strong  ABDOMEN: soft, nontender, no hepatosplenomegaly, no masses and bowel sounds normal  MS: no gross musculoskeletal defects noted, no edema    Procedure Note  Consent:Risks, benefits and alternatives were discussed. Patient's questions were elicited and answered. Verbal informed consent was obtained.     Preoperative Diagnosis: Left gluteal loculated cystic lesion  Postoperative Diagnosis: same     Technique:   Skin " prep         Alcohol wipe  Anesthesia      3 cc 1% lidocaine, with epi   Procedure       18 gauge needle was placed and aspirated approximately 20cc of fluid, then more fluid was expressed with pressure  Hemostasis     Band-aid applied        EBL:                            minimal  Complications:            No  Tolerance:                   Pt tolerated procedure well and was in stable condition.   Pathology sent            No       Results for orders placed or performed in visit on 08/23/21   Hemoglobin A1c     Status: Abnormal   Result Value Ref Range    Hemoglobin A1C 7.3 (H) 0.0 - 5.6 %   Lipid Profile     Status: Abnormal   Result Value Ref Range    Cholesterol 139 <=199 mg/dL    Triglycerides 192 (H) <=149 mg/dL    Direct Measure HDL 51 >=50 mg/dL    LDL Cholesterol Calculated 50 <=129 mg/dL    Patient Fasting > 8hrs? Yes    Comprehensive metabolic panel     Status: Abnormal   Result Value Ref Range    Sodium 140 136 - 145 mmol/L    Potassium 3.7 3.5 - 5.0 mmol/L    Chloride 103 98 - 107 mmol/L    Carbon Dioxide (CO2) 24 22 - 31 mmol/L    Anion Gap 13 5 - 18 mmol/L    Urea Nitrogen 20 8 - 22 mg/dL    Creatinine 0.76 0.60 - 1.10 mg/dL    Calcium 10.0 8.5 - 10.5 mg/dL    Glucose 105 70 - 125 mg/dL    Alkaline Phosphatase 75 45 - 120 U/L    AST 25 0 - 40 U/L    ALT 29 0 - 45 U/L    Protein Total 8.2 (H) 6.0 - 8.0 g/dL    Albumin 4.2 3.5 - 5.0 g/dL    Bilirubin Total 0.7 0.0 - 1.0 mg/dL    GFR Estimate 89 >60 mL/min/1.73m2   CBC with platelets     Status: Normal   Result Value Ref Range    WBC Count 8.7 4.0 - 11.0 10e3/uL    RBC Count 4.91 3.80 - 5.20 10e6/uL    Hemoglobin 14.3 11.7 - 15.7 g/dL    Hematocrit 42.9 35.0 - 47.0 %    MCV 87 78 - 100 fL    MCH 29.1 26.5 - 33.0 pg    MCHC 33.3 31.5 - 36.5 g/dL    RDW 11.6 10.0 - 15.0 %    Platelet Count 241 150 - 450 10e3/uL

## 2021-08-23 NOTE — NURSING NOTE
Due to patient being non-English speaking/uses sign language, an  was used for this visit. Only for face-to-face interpretation by an external agency, date and length of interpretation can be found on the scanned worksheet.     name: Tyshawn uHbbard  Agency: [employer]  Language: Gavi   Telephone number: [phone]  Type of interpretation: Telephone, spoken

## 2021-08-24 NOTE — PATIENT INSTRUCTIONS
Referral for :     Ophthalmology    LOCATION/PLACE/Provider :    [facility] (mailed card to pt.)  DATE & TIME :    Sept. 7th at 8 am   PHONE :     [phone]  FAX :    [phone]  Appointment made by clinic staff/:    Caren

## 2021-09-07 ENCOUNTER — TRANSFERRED RECORDS (OUTPATIENT)
Dept: HEALTH INFORMATION MANAGEMENT | Facility: CLINIC | Age: 56
End: 2021-09-07

## 2021-09-07 LAB — RETINOPATHY: NEGATIVE

## 2021-09-30 ENCOUNTER — LAB (OUTPATIENT)
Dept: LAB | Facility: CLINIC | Age: 56
End: 2021-09-30
Payer: COMMERCIAL

## 2021-09-30 DIAGNOSIS — Z12.11 SPECIAL SCREENING FOR MALIGNANT NEOPLASMS, COLON: ICD-10-CM

## 2021-09-30 LAB — HEMOCCULT STL QL IA: NEGATIVE

## 2021-09-30 PROCEDURE — 82274 ASSAY TEST FOR BLOOD FECAL: CPT

## 2021-10-12 ENCOUNTER — ANCILLARY PROCEDURE (OUTPATIENT)
Dept: MAMMOGRAPHY | Facility: CLINIC | Age: 56
End: 2021-10-12
Attending: FAMILY MEDICINE
Payer: COMMERCIAL

## 2021-10-12 DIAGNOSIS — Z12.31 ENCOUNTER FOR SCREENING MAMMOGRAM FOR BREAST CANCER: ICD-10-CM

## 2021-10-12 PROCEDURE — 77067 SCR MAMMO BI INCL CAD: CPT

## 2021-10-22 ENCOUNTER — ANCILLARY PROCEDURE (OUTPATIENT)
Dept: MAMMOGRAPHY | Facility: CLINIC | Age: 56
End: 2021-10-22
Attending: FAMILY MEDICINE
Payer: COMMERCIAL

## 2021-10-22 DIAGNOSIS — N64.89 BREAST ASYMMETRY: ICD-10-CM

## 2021-10-22 PROCEDURE — 77061 BREAST TOMOSYNTHESIS UNI: CPT | Mod: LT

## 2022-05-16 ENCOUNTER — OFFICE VISIT (OUTPATIENT)
Dept: FAMILY MEDICINE | Facility: CLINIC | Age: 57
End: 2022-05-16
Payer: COMMERCIAL

## 2022-05-16 VITALS
WEIGHT: 116.12 LBS | SYSTOLIC BLOOD PRESSURE: 130 MMHG | HEART RATE: 86 BPM | TEMPERATURE: 98 F | HEIGHT: 59 IN | OXYGEN SATURATION: 96 % | DIASTOLIC BLOOD PRESSURE: 79 MMHG | BODY MASS INDEX: 23.41 KG/M2 | RESPIRATION RATE: 16 BRPM

## 2022-05-16 DIAGNOSIS — E11.9 TYPE 2 DIABETES MELLITUS WITHOUT COMPLICATION, WITHOUT LONG-TERM CURRENT USE OF INSULIN (H): Primary | ICD-10-CM

## 2022-05-16 LAB
ALBUMIN SERPL-MCNC: 4.3 G/DL (ref 3.5–5)
ALP SERPL-CCNC: 83 U/L (ref 45–120)
ALT SERPL W P-5'-P-CCNC: 32 U/L (ref 0–45)
ANION GAP SERPL CALCULATED.3IONS-SCNC: 12 MMOL/L (ref 5–18)
AST SERPL W P-5'-P-CCNC: 26 U/L (ref 0–40)
BILIRUB SERPL-MCNC: 0.6 MG/DL (ref 0–1)
BUN SERPL-MCNC: 18 MG/DL (ref 8–22)
CALCIUM SERPL-MCNC: 9.7 MG/DL (ref 8.5–10.5)
CHLORIDE BLD-SCNC: 103 MMOL/L (ref 98–107)
CO2 SERPL-SCNC: 21 MMOL/L (ref 22–31)
CREAT SERPL-MCNC: 0.7 MG/DL (ref 0.6–1.1)
CREAT UR-MCNC: 83 MG/DL
GFR SERPL CREATININE-BSD FRML MDRD: >90 ML/MIN/1.73M2
GLUCOSE BLD-MCNC: 163 MG/DL (ref 70–125)
HBA1C MFR BLD: 8.6 % (ref 0–5.6)
MICROALBUMIN UR-MCNC: 2.41 MG/DL (ref 0–1.99)
MICROALBUMIN/CREAT UR: 29 MG/G CR
POTASSIUM BLD-SCNC: 4.2 MMOL/L (ref 3.5–5)
PROT SERPL-MCNC: 8.1 G/DL (ref 6–8)
SODIUM SERPL-SCNC: 136 MMOL/L (ref 136–145)

## 2022-05-16 PROCEDURE — 36415 COLL VENOUS BLD VENIPUNCTURE: CPT | Performed by: STUDENT IN AN ORGANIZED HEALTH CARE EDUCATION/TRAINING PROGRAM

## 2022-05-16 PROCEDURE — 83036 HEMOGLOBIN GLYCOSYLATED A1C: CPT | Performed by: STUDENT IN AN ORGANIZED HEALTH CARE EDUCATION/TRAINING PROGRAM

## 2022-05-16 PROCEDURE — 99214 OFFICE O/P EST MOD 30 MIN: CPT | Mod: GC | Performed by: STUDENT IN AN ORGANIZED HEALTH CARE EDUCATION/TRAINING PROGRAM

## 2022-05-16 PROCEDURE — 80053 COMPREHEN METABOLIC PANEL: CPT | Performed by: STUDENT IN AN ORGANIZED HEALTH CARE EDUCATION/TRAINING PROGRAM

## 2022-05-16 PROCEDURE — 82043 UR ALBUMIN QUANTITATIVE: CPT | Performed by: STUDENT IN AN ORGANIZED HEALTH CARE EDUCATION/TRAINING PROGRAM

## 2022-05-16 RX ORDER — INFLUENZA VIRUS VACCINE 15; 15; 15; 15 UG/.5ML; UG/.5ML; UG/.5ML; UG/.5ML
0.5 SUSPENSION INTRAMUSCULAR ONCE
COMMUNITY
Start: 2021-09-21 | End: 2024-03-22

## 2022-05-16 NOTE — PROGRESS NOTES
Assessment & Plan     Type 2 diabetes mellitus without complication, without long-term current use of insulin (H)  Discussed with patient that her A1c has increased from 7.3 to 8.6. Will increase Januvia to 50 mg daily, counseled patient that she may take 2 tablets of the 25 mg dose she has at home and take only 1 tablet of the new medication. Also discussed lifestyle modifications including exercise, recommend starting with low impact exercises that she can YouTube and starting out small with 10-15 min per day and working her way up. Also discussed meeting with a Diabetic educator to discuss diet, patient declined at this time. Recommended she check her BG twice a day and will follow up in 2 weeks to assess for BG improvement.   - Comprehensive metabolic panel; Future  - Hemoglobin A1c; Future  - Albumin Random Urine Quantitative with Creat Ratio; Future  - UT FOOT EXAM NO CHARGE  - Comprehensive metabolic panel  - Hemoglobin A1c  - Albumin Random Urine Quantitative with Creat Ratio  - sitagliptin (JANUVIA) 50 MG tablet; Take 1 tablet (50 mg) by mouth daily  - Follow up in 2 weeks    Options for treatment and follow-up care were reviewed with the patient and/or guardian. Pt and/or guardian engaged in the decision making process and verbalized understanding of the options discussed and agreed with the final plan.    Precepted today with: DO Bárbara Braden MD  [facility] Family Medicine Resident PGY-3  [facility]        Sonya Lopez is a 57 year old who presents for follow up on T2DM.    HPI     Diabetes Follow-up  Pateint on metformin, Januvia, and Glipizide. Checks sugars 2 times a day.      How often are you checking your blood sugar? Check sugars in the morning. 128,130, sometimes 150.     What concerns do you have today about your diabetes? None     Do you have any of these symptoms? No jitteriness, lightheadedness, tremor. No increased thrist, weight changes,  "sometimes has blurry vision. Last eye exam was 9/2021 and was normal per patient but does have cataracts noted on her last exam. No peripheral numbness or tingling, no ulcers or wounds on feet.       BP Readings from Last 2 Encounters:   05/16/22 130/79   08/23/21 127/85     Hemoglobin A1C POCT (%)   Date Value   03/08/2021 7.7 (H)   07/30/2020 8.1 (H)     Hemoglobin A1C (%)   Date Value   08/23/2021 7.3 (H)     LDL Cholesterol Calculated (mg/dL)   Date Value   08/23/2021 50   01/30/2017 63   03/23/2016 89     LDL Cholesterol Direct (mg/dL)   Date Value   07/30/2020 56.0   03/15/2019 47.0                 How many servings of fruits and vegetables do you eat daily?  2-3    On average, how many sweetened beverages do you drink each day (Examples: soda, juice, sweet tea, etc.  Do NOT count diet or artificially sweetened beverages)?   0    How many days per week do you exercise enough to make your heart beat faster? 3 or less Baby sits 6 grand kids so it is difficult to find time to exercise, she states they come early and leave late.      How many minutes a day do you exercise enough to make your heart beat faster? 9 or less    How many days per week do you miss taking your medication? 0      Review of Systems   Constitutional, HEENT, cardiovascular, pulmonary, gi and gu systems are negative, except as recb2uptdx noted.      Objective    /79   Pulse 86   Temp 98  F (36.7  C) (Oral)   Resp 16   Ht 1.49 m (4' 10.66\")   Wt 52.7 kg (116 lb 1.9 oz)   SpO2 96%   BMI 23.73 kg/m    Body mass index is 23.73 kg/m .  Physical Exam   GENERAL: healthy, alert and no distress  NECK: no adenopathy, no asymmetry, masses, or scars and thyroid normal to palpation  RESP: lungs clear to auscultation - no rales, rhonchi or wheezes  CV: regular rate and rhythm, normal S1 S2, no S3 or S4, no murmur, click or rub, no peripheral edema and peripheral pulses strong  ABDOMEN: soft, nontender, no hepatosplenomegaly, no masses and bowel " sounds normal  MS: no gross musculoskeletal defects noted, no edema    Diabetic foot exam: normal DP and PT pulses, no trophic changes or ulcerative lesions and normal sensory exam      Results for orders placed or performed in visit on 05/16/22 (from the past 24 hour(s))   Hemoglobin A1c   Result Value Ref Range    Hemoglobin A1C 8.6 (H) 0.0 - 5.6 %

## 2022-05-16 NOTE — NURSING NOTE
Due to patient being non-English speaking/uses sign language, an  was used for this visit. Only for face-to-face interpretation by an external agency, date and length of interpretation can be found on the scanned worksheet.     name: Fatmata  Agency: [employer]  Language: Gavi   Telephone number: [phone]  Type of interpretation: Telephone, spoken

## 2022-05-16 NOTE — PROGRESS NOTES
Preceptor Attestation:   Patient seen, evaluated and discussed with the resident. I have verified the content of the note, which accurately reflects my assessment of the patient and the plan of care.  Will increase her Januvia to 50mg daily as her A1C was 8.6% today.  Supervising Physician:Mary Almanza, DO [facility]

## 2022-05-19 ENCOUNTER — APPOINTMENT (OUTPATIENT)
Dept: INTERPRETER SERVICES | Facility: CLINIC | Age: 57
End: 2022-05-19
Payer: COMMERCIAL

## 2022-05-19 ENCOUNTER — TELEPHONE (OUTPATIENT)
Dept: FAMILY MEDICINE | Facility: CLINIC | Age: 57
End: 2022-05-19
Payer: COMMERCIAL

## 2022-05-19 NOTE — TELEPHONE ENCOUNTER
Luis Armando voicemail to  for Dr Fontaine lab results and I will also send letter- help from   [employer]

## 2022-05-19 NOTE — LETTER
May 19, 2022      John Chun  [address] [place] [address]        Hi John,     Your labs showed that your kidneys may not be filtering as well as they could, we can help this by getting your blood sugars under tighter control. We can keep working on this together. Your A1c was higher than it was 8 months ago and andrzej from 7.3 to 8.6. We will follow up at your next appointment with Dr. Almanza.     Dr. Fontaine       Sincerely,    Mary Almanza, DO and Dr Fontaine

## 2022-05-19 NOTE — RESULT ENCOUNTER NOTE
Please call patient with the following:    Oliver Lopez,    Your labs showed that your kidneys may not be filtering as well as they could, we can help this by getting your blood sugars under tighter control. We can keep working on this together. Your A1c was higher than it was 8 months ago and andrzej from 7.3 to 8.6. We will follow up at your next appointment with Dr. Almanza.    Dr. Fontaine

## 2022-05-27 ENCOUNTER — TELEPHONE (OUTPATIENT)
Dept: FAMILY MEDICINE | Facility: CLINIC | Age: 57
End: 2022-05-27

## 2022-05-27 ENCOUNTER — APPOINTMENT (OUTPATIENT)
Dept: INTERPRETER SERVICES | Facility: CLINIC | Age: 57
End: 2022-05-27
Payer: COMMERCIAL

## 2022-05-27 NOTE — TELEPHONE ENCOUNTER
Called pt with [facility]  liz Whittington with lab results that were sent to her on 5/19/22 and pt has appointment 6/6/22 also

## 2022-05-31 ENCOUNTER — TELEPHONE (OUTPATIENT)
Dept: FAMILY MEDICINE | Facility: CLINIC | Age: 57
End: 2022-05-31

## 2022-06-06 ENCOUNTER — OFFICE VISIT (OUTPATIENT)
Dept: FAMILY MEDICINE | Facility: CLINIC | Age: 57
End: 2022-06-06
Payer: COMMERCIAL

## 2022-06-06 VITALS
DIASTOLIC BLOOD PRESSURE: 81 MMHG | BODY MASS INDEX: 24.73 KG/M2 | SYSTOLIC BLOOD PRESSURE: 134 MMHG | WEIGHT: 117.8 LBS | HEART RATE: 82 BPM | HEIGHT: 58 IN | TEMPERATURE: 98 F | OXYGEN SATURATION: 98 % | RESPIRATION RATE: 16 BRPM

## 2022-06-06 DIAGNOSIS — E11.9 TYPE 2 DIABETES MELLITUS WITHOUT COMPLICATION, WITHOUT LONG-TERM CURRENT USE OF INSULIN (H): Primary | ICD-10-CM

## 2022-06-06 DIAGNOSIS — R80.9 MICROALBUMINURIA: ICD-10-CM

## 2022-06-06 PROCEDURE — 99214 OFFICE O/P EST MOD 30 MIN: CPT | Performed by: FAMILY MEDICINE

## 2022-06-06 NOTE — NURSING NOTE
Due to patient being non-English speaking/uses sign language, an  was used for this visit. Only for face-to-face interpretation by an external agency, date and length of interpretation can be found on the scanned worksheet.     name: Celestine  Agency: [employer]  Language: Ashleyong   Telephone number: [phone]   Type of interpretation: Face-to-face, spoken

## 2022-06-06 NOTE — PATIENT INSTRUCTIONS
Please call at end of July/ early August to schedule your next appointment for diabetes at the end of August

## 2022-06-06 NOTE — PROGRESS NOTES
Assessment & Plan     (E11.9) Type 2 diabetes mellitus without complication, without long-term current use of insulin (H)  (primary encounter diagnosis)  Comment:   Plan: Continue with sitagliptin (JANUVIA) 50 MG tablet, glipizide, and metformin, plan to check A1C in 2 months      (R80.9) Microalbuminuria  Comment: On ACEI, continue to monitor          {Provider  Link to [facility] Help Grid :[id number]}         No follow-ups on file.    Mary Almanza DO  [facility] [place] Subjective Pang is a 57 year old who presents for the following health issues     HPI     Diabetes Follow-up    How often are you checking your blood sugar? Two times daily  Blood sugar testing frequency justification:  Adjustment of medication(s)  What time of day are you checking your blood sugars (select all that apply)?  Before meals and At bedtime  Have you had any blood sugars above 200?  No  Have you had any blood sugars below 70?  No        fasting am labs under 140- 150, sometimes before bed are 180         What concerns do you have today about your diabetes? None     Do you have any of these symptoms? (Select all that apply)  No numbness or tingling in feet.  No redness, sores or blisters on feet.  No complaints of excessive thirst.  No reports of blurry vision.  No significant changes to weight.      BP Readings from Last 2 Encounters:   06/06/22 134/81   05/16/22 130/79     Lab Results   Component Value Date    A1C 8.6 05/16/2022    A1C 7.3 08/23/2021    A1C 7.7 03/08/2021    A1C 8.1 07/30/2020    A1C 8.2 11/22/2019    A1C 7.8 03/15/2019    A1C 8.1 12/17/2018     Discussed her A1C was increasing from last time. It was recommended at last visit she increase her Januvia to 50mg daily. She has been able to do this with no concerns. She continues to take er glipizide and metformin as well.  She is on an aspirin, statin and ACEI              Review of Systems         Objective    /81   Pulse 82   Temp 98  F (36.7  " C) (Oral)   Resp 16   Ht 1.48 m (4' 10.27\")   Wt 53.4 kg (117 lb 12.8 oz)   SpO2 98%   BMI 24.39 kg/m    Body mass index is 24.39 kg/m .  Physical Exam  HENT:      Right Ear: External ear normal.   Eyes:      Conjunctiva/sclera: Conjunctivae normal.      Pupils: Pupils are equal, round, and reactive to light.   Cardiovascular:      Rate and Rhythm: Normal rate and regular rhythm.      Heart sounds: Normal heart sounds. No murmur heard.  Pulmonary:      Effort: Pulmonary effort is normal. No respiratory distress.      Breath sounds: Normal breath sounds.   Musculoskeletal:      Cervical back: Normal range of motion.   Lymphadenopathy:      Cervical: No cervical adenopathy.   Skin:     General: Skin is warm and dry.   Neurological:      Mental Status: She is alert and oriented to person, place, and time.   Psychiatric:         Behavior: Behavior normal.         Thought Content: Thought content normal.         Judgment: Judgment normal.                        "

## 2022-06-07 ENCOUNTER — TELEPHONE (OUTPATIENT)
Dept: FAMILY MEDICINE | Facility: CLINIC | Age: 57
End: 2022-06-07

## 2022-08-12 DIAGNOSIS — E11.9 TYPE 2 DIABETES MELLITUS WITHOUT COMPLICATION, WITHOUT LONG-TERM CURRENT USE OF INSULIN (H): ICD-10-CM

## 2022-09-16 ENCOUNTER — OFFICE VISIT (OUTPATIENT)
Dept: FAMILY MEDICINE | Facility: CLINIC | Age: 57
End: 2022-09-16
Payer: COMMERCIAL

## 2022-09-16 VITALS
OXYGEN SATURATION: 99 % | BODY MASS INDEX: 23.59 KG/M2 | SYSTOLIC BLOOD PRESSURE: 123 MMHG | RESPIRATION RATE: 16 BRPM | DIASTOLIC BLOOD PRESSURE: 77 MMHG | WEIGHT: 117 LBS | HEIGHT: 59 IN | HEART RATE: 83 BPM

## 2022-09-16 DIAGNOSIS — Z23 NEED FOR PROPHYLACTIC VACCINATION AND INOCULATION AGAINST INFLUENZA: ICD-10-CM

## 2022-09-16 DIAGNOSIS — Z12.11 SPECIAL SCREENING FOR MALIGNANT NEOPLASMS, COLON: ICD-10-CM

## 2022-09-16 DIAGNOSIS — I10 BENIGN ESSENTIAL HYPERTENSION: ICD-10-CM

## 2022-09-16 DIAGNOSIS — R80.9 MICROALBUMINURIA: ICD-10-CM

## 2022-09-16 DIAGNOSIS — E11.9 TYPE 2 DIABETES MELLITUS WITHOUT COMPLICATION, WITHOUT LONG-TERM CURRENT USE OF INSULIN (H): Primary | ICD-10-CM

## 2022-09-16 DIAGNOSIS — E78.5 HYPERLIPIDEMIA LDL GOAL <100: ICD-10-CM

## 2022-09-16 LAB
ALBUMIN SERPL BCG-MCNC: 4.3 G/DL (ref 3.5–5.2)
ALP SERPL-CCNC: 70 U/L (ref 35–104)
ALT SERPL W P-5'-P-CCNC: 26 U/L (ref 10–35)
ANION GAP SERPL CALCULATED.3IONS-SCNC: 15 MMOL/L (ref 7–15)
AST SERPL W P-5'-P-CCNC: 31 U/L (ref 10–35)
BILIRUB SERPL-MCNC: 0.4 MG/DL
BUN SERPL-MCNC: 20.9 MG/DL (ref 6–20)
CALCIUM SERPL-MCNC: 9.4 MG/DL (ref 8.6–10)
CHLORIDE SERPL-SCNC: 102 MMOL/L (ref 98–107)
CHOLEST SERPL-MCNC: 122 MG/DL
CREAT SERPL-MCNC: 0.54 MG/DL (ref 0.51–0.95)
DEPRECATED HCO3 PLAS-SCNC: 21 MMOL/L (ref 22–29)
GFR SERPL CREATININE-BSD FRML MDRD: >90 ML/MIN/1.73M2
GLUCOSE SERPL-MCNC: 165 MG/DL (ref 70–99)
HBA1C MFR BLD: 7.6 % (ref 0–5.6)
HDLC SERPL-MCNC: 46 MG/DL
LDLC SERPL CALC-MCNC: 48 MG/DL
NONHDLC SERPL-MCNC: 76 MG/DL
POTASSIUM SERPL-SCNC: 4.3 MMOL/L (ref 3.4–5.3)
PROT SERPL-MCNC: 7.6 G/DL (ref 6.4–8.3)
SODIUM SERPL-SCNC: 138 MMOL/L (ref 136–145)
TRIGL SERPL-MCNC: 141 MG/DL

## 2022-09-16 PROCEDURE — 90471 IMMUNIZATION ADMIN: CPT | Performed by: FAMILY MEDICINE

## 2022-09-16 PROCEDURE — 83036 HEMOGLOBIN GLYCOSYLATED A1C: CPT | Performed by: FAMILY MEDICINE

## 2022-09-16 PROCEDURE — 99214 OFFICE O/P EST MOD 30 MIN: CPT | Mod: 25 | Performed by: FAMILY MEDICINE

## 2022-09-16 PROCEDURE — 36415 COLL VENOUS BLD VENIPUNCTURE: CPT | Performed by: FAMILY MEDICINE

## 2022-09-16 PROCEDURE — 90682 RIV4 VACC RECOMBINANT DNA IM: CPT | Performed by: FAMILY MEDICINE

## 2022-09-16 PROCEDURE — 80061 LIPID PANEL: CPT | Performed by: FAMILY MEDICINE

## 2022-09-16 PROCEDURE — 80053 COMPREHEN METABOLIC PANEL: CPT | Performed by: FAMILY MEDICINE

## 2022-09-16 RX ORDER — ASPIRIN 81 MG/1
81 TABLET, COATED ORAL DAILY
COMMUNITY
Start: 2022-09-16 | End: 2024-08-12

## 2022-09-16 RX ORDER — LISINOPRIL 5 MG/1
5 TABLET ORAL DAILY
Qty: 90 TABLET | Refills: 3 | Status: SHIPPED | OUTPATIENT
Start: 2022-09-16 | End: 2023-09-14

## 2022-09-16 RX ORDER — METFORMIN HCL 500 MG
500 TABLET, EXTENDED RELEASE 24 HR ORAL
Qty: 90 TABLET | Refills: 3 | Status: SHIPPED | OUTPATIENT
Start: 2022-09-16 | End: 2023-09-14

## 2022-09-16 RX ORDER — GLIPIZIDE 10 MG/1
10 TABLET, FILM COATED, EXTENDED RELEASE ORAL DAILY
Qty: 90 TABLET | Refills: 3 | Status: SHIPPED | OUTPATIENT
Start: 2022-09-16 | End: 2023-09-28

## 2022-09-16 RX ORDER — ATORVASTATIN CALCIUM 20 MG/1
10 TABLET, FILM COATED ORAL DAILY
Qty: 90 TABLET | Refills: 3 | Status: SHIPPED | OUTPATIENT
Start: 2022-09-16 | End: 2023-09-28

## 2022-09-16 NOTE — PROGRESS NOTES
Assessment & Plan     Type 2 diabetes mellitus without complication, without long-term current use of insulin (H)  Much improvement in her A1C from last visit, down 1.0, congratulated on success. Continue current medication regimen.    - Hemoglobin A1c  - Lipid Profile  - Comprehensive metabolic panel  - Adult Eye [facility] Referral  - sitagliptin (JANUVIA) 50 MG tablet  Dispense: 90 tablet; Refill: 3  - glipiZIDE (GLIPIZIDE XL) 10 MG 24 hr tablet  Dispense: 90 tablet; Refill: 3  - ASPIRIN LOW DOSE 81 MG EC tablet  - blood glucose (NO BRAND SPECIFIED) test strip  Dispense: 100 strip; Refill: 11  - blood glucose (NO BRAND SPECIFIED) lancets standard  Dispense: 100 each; Refill: 3  - metFORMIN (GLUCOPHAGE XR) 500 MG 24 hr tablet  Dispense: 90 tablet; Refill: 3  - Lipid Profile  - Comprehensive metabolic panel    Special screening for malignant neoplasms, colon  Return the 1st week of October  - Fecal colorectal cancer screen FIT    Microalbuminuria  Continue ACEI    Benign essential hypertension  - lisinopril (ZESTRIL) 5 MG tablet  Dispense: 90 tablet; Refill: 3    Hyperlipidemia LDL goal <100  - atorvastatin (LIPITOR) 20 MG tablet  Dispense: 90 tablet; Refill: 3    Need for prophylactic vaccination and inoculation against influenza  - INFLUENZA QUAD, RECOMBINANT, P-FREE (RIV4) (FLUBLOK)    Discussed COVID19 vaccine/booster with patient and the benefit of protection for themself and others. Patient has declined the vaccine.                   No follow-ups on file.    Mary Almanza DO  [facility] [place] [place] Lopez is a 57 year old, presenting for the following health issues:  RECHECK (Follow up a1c) and Imm/Inj (Flu Shot)      HPI     Diabetes Follow-up    How often are you checking your blood sugar? One time daily  What time of day are you checking your blood sugars (select all that apply)?  fasting in the AM. 130-140 to 160-170, she notices the higher range depending on what she  "has eaten at night.  Have you had any blood sugars above 200?  No  Have you had any blood sugars below 70?  No    What concerns do you have today about your diabetes? None     Do you have any of these symptoms? (Select all that apply)  No numbness or tingling in feet.  No redness, sores or blisters on feet.  No complaints of excessive thirst.  No reports of blurry vision.  No significant changes to weight.    Have you had a diabetic eye exam in the last 12 months? No, last visit was 1 year ago exactly (9/2021)    Has been taking her glipizide and Januvia as directed.  On ACEI  For kidney protection    Hyperlipidemia Follow-Up      Are you regularly taking any medication or supplement to lower your cholesterol?   Yes- statin    Are you having muscle aches or other side effects that you think could be caused by your cholesterol lowering medication?  No      BP Readings from Last 2 Encounters:   09/16/22 123/77   06/06/22 134/81     Hemoglobin A1C (%)   Date Value   09/16/2022 7.6 (H)   05/16/2022 8.6 (H)   03/08/2021 7.7 (H)   07/30/2020 8.1 (H)     LDL Cholesterol Calculated (mg/dL)   Date Value   09/16/2022 48   08/23/2021 50   01/30/2017 63   03/23/2016 89     LDL Cholesterol Direct (mg/dL)   Date Value   07/30/2020 56.0   03/15/2019 47.0       HCM: Plan to take home FIT test for colon cancer screening and will schedule her mammogram.    Review of Systems         Objective    /77   Pulse 83   Resp 16   Ht 1.49 m (4' 10.66\")   Wt 53.1 kg (117 lb)   SpO2 99%   BMI 23.91 kg/m    Body mass index is 23.91 kg/m .  Physical Exam   GENERAL: healthy, alert and no distress  HENT: ear canals and TM's normal, nose and mouth without ulcers or lesions  RESP: lungs clear to auscultation - no rales, rhonchi or wheezes  CV: regular rate and rhythm, normal S1 S2, no S3 or S4, no murmur, click or rub, no peripheral edema and peripheral pulses strong  ABDOMEN: soft, nontender, no hepatosplenomegaly, no masses and bowel " sounds normal  MS: no gross musculoskeletal defects noted, no edema    Results for orders placed or performed in visit on 09/16/22   Hemoglobin A1c     Status: Abnormal   Result Value Ref Range    Hemoglobin A1C 7.6 (H) 0.0 - 5.6 %   Lipid Profile     Status: Abnormal   Result Value Ref Range    Cholesterol 122 <200 mg/dL    Triglycerides 141 <150 mg/dL    Direct Measure HDL 46 (L) >=50 mg/dL    LDL Cholesterol Calculated 48 <=100 mg/dL    Non HDL Cholesterol 76 <130 mg/dL    Narrative    Cholesterol  Desirable:  <200 mg/dL    Triglycerides  Normal:  Less than 150 mg/dL  Borderline High:  150-199 mg/dL  High:  200-499 mg/dL  Very High:  Greater than or equal to 500 mg/dL    Direct Measure HDL  Female:  Greater than or equal to 50 mg/dL   Male:  Greater than or equal to 40 mg/dL    LDL Cholesterol  Desirable:  <100mg/dL  Above Desirable:  100-129 mg/dL   Borderline High:  130-159 mg/dL   High:  160-189 mg/dL   Very High:  >= 190 mg/dL    Non HDL Cholesterol  Desirable:  130 mg/dL  Above Desirable:  130-159 mg/dL  Borderline High:  160-189 mg/dL  High:  190-219 mg/dL  Very High:  Greater than or equal to 220 mg/dL   Comprehensive metabolic panel     Status: Abnormal   Result Value Ref Range    Sodium 138 136 - 145 mmol/L    Potassium 4.3 3.4 - 5.3 mmol/L    Chloride 102 98 - 107 mmol/L    Carbon Dioxide (CO2) 21 (L) 22 - 29 mmol/L    Anion Gap 15 7 - 15 mmol/L    Urea Nitrogen 20.9 (H) 6.0 - 20.0 mg/dL    Creatinine 0.54 0.51 - 0.95 mg/dL    Calcium 9.4 8.6 - 10.0 mg/dL    Glucose 165 (H) 70 - 99 mg/dL    Alkaline Phosphatase 70 35 - 104 U/L    AST 31 10 - 35 U/L    ALT 26 10 - 35 U/L    Protein Total 7.6 6.4 - 8.3 g/dL    Albumin 4.3 3.5 - 5.2 g/dL    Bilirubin Total 0.4 <=1.2 mg/dL    GFR Estimate >90 >60 mL/min/1.73m2

## 2022-09-16 NOTE — PATIENT INSTRUCTIONS
Take home the stool kit for colon cancer screening and return to clinic the first week of October.    Please call [phone] (option 1) to schedule your mammogram.

## 2022-09-16 NOTE — LETTER
October 7, 2022      John Chun  [address] [place] [address]        Dear ,    We are writing to inform you of your test results.      Resulted Orders   Hemoglobin A1c   Result Value Ref Range    Hemoglobin A1C 7.6 (H) 0.0 - 5.6 %      Comment:      Normal <5.7%   Prediabetes 5.7-6.4%    Diabetes 6.5% or higher     Note: Adopted from ADA consensus guidelines.   Lipid Profile   Result Value Ref Range    Cholesterol 122 <200 mg/dL    Triglycerides 141 <150 mg/dL    Direct Measure HDL 46 (L) >=50 mg/dL    LDL Cholesterol Calculated 48 <=100 mg/dL    Non HDL Cholesterol 76 <130 mg/dL    Narrative    Cholesterol  Desirable:  <200 mg/dL    Triglycerides  Normal:  Less than 150 mg/dL  Borderline High:  150-199 mg/dL  High:  200-499 mg/dL  Very High:  Greater than or equal to 500 mg/dL    Direct Measure HDL  Female:  Greater than or equal to 50 mg/dL   Male:  Greater than or equal to 40 mg/dL    LDL Cholesterol  Desirable:  <100mg/dL  Above Desirable:  100-129 mg/dL   Borderline High:  130-159 mg/dL   High:  160-189 mg/dL   Very High:  >= 190 mg/dL    Non HDL Cholesterol  Desirable:  130 mg/dL  Above Desirable:  130-159 mg/dL  Borderline High:  160-189 mg/dL  High:  190-219 mg/dL  Very High:  Greater than or equal to 220 mg/dL   Comprehensive metabolic panel   Result Value Ref Range    Sodium 138 136 - 145 mmol/L    Potassium 4.3 3.4 - 5.3 mmol/L    Chloride 102 98 - 107 mmol/L    Carbon Dioxide (CO2) 21 (L) 22 - 29 mmol/L    Anion Gap 15 7 - 15 mmol/L    Urea Nitrogen 20.9 (H) 6.0 - 20.0 mg/dL    Creatinine 0.54 0.51 - 0.95 mg/dL    Calcium 9.4 8.6 - 10.0 mg/dL    Glucose 165 (H) 70 - 99 mg/dL    Alkaline Phosphatase 70 35 - 104 U/L    AST 31 10 - 35 U/L    ALT 26 10 - 35 U/L    Protein Total 7.6 6.4 - 8.3 g/dL    Albumin 4.3 3.5 - 5.2 g/dL    Bilirubin Total 0.4 <=1.2 mg/dL    GFR Estimate >90 >60 mL/min/1.73m2      Comment:      Effective December 21, 2021 eGFRcr in adults is calculated using the 2021  CKD-EPI creatinine equation which includes age and gender (Scottie et al., NEJM, DOI: 10.1056/JASCdd4643033)   Fecal colorectal cancer screen FIT   Result Value Ref Range    Occult Blood Screen FIT Negative Negative       If you have any questions or concerns, please call the clinic at the number listed above.       Sincerely,      Mary Almanza, DO

## 2022-09-16 NOTE — LETTER
September 27, 2022      John Chun  [address] [place] [address]        Dear ,    We are writing to inform you of your test results.    Your lab results are within acceptable range      Resulted Orders   Hemoglobin A1c   Result Value Ref Range    Hemoglobin A1C 7.6 (H) 0.0 - 5.6 %      Comment:      Normal <5.7%   Prediabetes 5.7-6.4%    Diabetes 6.5% or higher     Note: Adopted from ADA consensus guidelines.   Lipid Profile   Result Value Ref Range    Cholesterol 122 <200 mg/dL    Triglycerides 141 <150 mg/dL    Direct Measure HDL 46 (L) >=50 mg/dL    LDL Cholesterol Calculated 48 <=100 mg/dL    Non HDL Cholesterol 76 <130 mg/dL    Narrative    Cholesterol  Desirable:  <200 mg/dL    Triglycerides  Normal:  Less than 150 mg/dL  Borderline High:  150-199 mg/dL  High:  200-499 mg/dL  Very High:  Greater than or equal to 500 mg/dL    Direct Measure HDL  Female:  Greater than or equal to 50 mg/dL   Male:  Greater than or equal to 40 mg/dL    LDL Cholesterol  Desirable:  <100mg/dL  Above Desirable:  100-129 mg/dL   Borderline High:  130-159 mg/dL   High:  160-189 mg/dL   Very High:  >= 190 mg/dL    Non HDL Cholesterol  Desirable:  130 mg/dL  Above Desirable:  130-159 mg/dL  Borderline High:  160-189 mg/dL  High:  190-219 mg/dL  Very High:  Greater than or equal to 220 mg/dL   Comprehensive metabolic panel   Result Value Ref Range    Sodium 138 136 - 145 mmol/L    Potassium 4.3 3.4 - 5.3 mmol/L    Chloride 102 98 - 107 mmol/L    Carbon Dioxide (CO2) 21 (L) 22 - 29 mmol/L    Anion Gap 15 7 - 15 mmol/L    Urea Nitrogen 20.9 (H) 6.0 - 20.0 mg/dL    Creatinine 0.54 0.51 - 0.95 mg/dL    Calcium 9.4 8.6 - 10.0 mg/dL    Glucose 165 (H) 70 - 99 mg/dL    Alkaline Phosphatase 70 35 - 104 U/L    AST 31 10 - 35 U/L    ALT 26 10 - 35 U/L    Protein Total 7.6 6.4 - 8.3 g/dL    Albumin 4.3 3.5 - 5.2 g/dL    Bilirubin Total 0.4 <=1.2 mg/dL    GFR Estimate >90 >60 mL/min/1.73m2      Comment:      Effective December 21, 2021  eGFRcr in adults is calculated using the 2021 CKD-EPI creatinine equation which includes age and gender (Scottie et al., NEJM, DOI: 10.1056/PLZZhe8853819)       If you have any questions or concerns, please call the clinic at the number listed above.       Sincerely,      Mary Almanza, DO

## 2022-09-16 NOTE — NURSING NOTE
Due to patient being non-English speaking/uses sign language, an  was used for this visit. Only for face-to-face interpretation by an external agency, date and length of interpretation can be found on the scanned worksheet.     name: [place]  Agency: [employer]  Language: Hmong   Telephone number: [phone]  Type of interpretation: Face-to-face, spoken

## 2022-09-26 LAB — RETINOPATHY: NORMAL

## 2022-10-03 PROCEDURE — 82274 ASSAY TEST FOR BLOOD FECAL: CPT | Performed by: FAMILY MEDICINE

## 2022-10-05 LAB — HEMOCCULT STL QL IA: NEGATIVE

## 2022-11-07 ENCOUNTER — ANCILLARY PROCEDURE (OUTPATIENT)
Dept: MAMMOGRAPHY | Facility: CLINIC | Age: 57
End: 2022-11-07
Attending: FAMILY MEDICINE
Payer: COMMERCIAL

## 2022-11-07 DIAGNOSIS — Z12.31 VISIT FOR SCREENING MAMMOGRAM: ICD-10-CM

## 2022-11-07 PROCEDURE — 77067 SCR MAMMO BI INCL CAD: CPT

## 2022-12-19 NOTE — MR AVS SNAPSHOT
12/19/2022         RE: Marco Stovall  [address]  [place] [address]        Dear Colleague,    Thank you for referring your patient, Marco Stovall, to the [facility]. Please see a copy of my visit note below.    Marco is a 57 year old who is being evaluated via a billable video visit.      How would you like to obtain your AVS? MyChart  If the video visit is dropped, the invitation should be resent by: Text to cell phone: [phone]  Will anyone else be joining your video visit? No        Video-Visit Details    Video Start Time: 3:34 PM    Type of service:  Video Visit    Video End Time:3:38 PM    Originating Location (pt. Location): Home        Distant Location (provider location):  On-site    Platform used for Video Visit: [facility]     57 year old man status post open umbilical hernia 4 weeks ago. The patient has no complaints, is off narcotics, and has returned to normal activities.       Abdomen: Incisions well healed      Impression and Plan:  Doing well status post umbilical hernia repair. F/U prn.       Again, thank you for allowing me to participate in the care of your patient.        Sincerely,        Denise Espinoza MD     "              After Visit Summary   4/12/2018    John Chun    MRN: [id number]           Patient Information     Date Of Birth          1965        Visit Information        Provider Department      4/12/2018 9:00 AM Pancho Jaime MD [facility]        Today's Diagnoses     Cellulitis of finger of left hand    -  1       Follow-ups after your visit        Who to contact     Please call your clinic at [phone] to:    Ask questions about your health    Make or cancel appointments    Discuss your medicines    Learn about your test results    Speak to your doctor            Additional Information About Your Visit        Care EveryWhere ID     This is your Care EveryWhere ID. This could be used by other organizations to access your [facility] medical records  [id number]        Your Vitals Were     Pulse Temperature Respirations Height Pulse Oximetry BMI (Body Mass Index)    74 97.5  F (36.4  C) (Oral) 16 4' 11.25\" (150.5 cm) 100% 23.07 kg/m2       Blood Pressure from Last 3 Encounters:   04/12/18 128/79   02/12/18 130/84   10/03/17 105/69    Weight from Last 3 Encounters:   04/12/18 115 lb 3.2 oz (52.3 kg)   02/12/18 113 lb 12.8 oz (51.6 kg)   10/03/17 113 lb 6.4 oz (51.4 kg)              We Performed the Following     Culture  Wound ([facility])          Today's Medication Changes          These changes are accurate as of 4/12/18  9:43 AM.  If you have any questions, ask your nurse or doctor.               Start taking these medicines.        Dose/Directions    cephALEXin 500 MG capsule   Commonly known as:  KEFLEX   Used for:  Cellulitis of finger of left hand   Started by:  Pancho Jaime MD        Dose:  500 mg   Take 1 capsule (500 mg) by mouth 4 times daily   Quantity:  40 capsule   Refills:  0            Where to get your medicines      [id number] [place], [place] - [address] AT [facility] & [address], " [place] [address]     Phone:  [phone]     cephALEXin 500 MG capsule                Primary Care Provider Office Phone # Fax #    Mary Almanza [phone]       [facility] [address] [facility] [address]        Equal Access to Services     GAGE CADENA : Hadii aad ku hadasho Soomaali, waaxda luqadaha, qaybta kaalmada adeegyada, waxay idiin hayaan adeeg khrhodash la'hortensian ah. So [facility] [phone].    ATENCIÓN: Si habla español, tiene a abdi disposición servicios gratuitos de asistencia lingüística. [facility] [phone].    We comply with applicable federal civil rights laws and [place] laws. We do not discriminate on the basis of race, color, national origin, age, disability, sex, sexual orientation, or gender identity.            Thank you!     Thank you for choosing [facility]  for your care. Our goal is always to provide you with excellent care. Hearing back from our patients is one way we can continue to improve our services. Please take a few minutes to complete the written survey that you may receive in the mail after your visit with us. Thank you!             Your Updated Medication List - Protect others around you: Learn how to safely use, store and throw away your medicines at www.disposemymeds.org.          This list is accurate as of 4/12/18  9:43 AM.  Always use your most recent med list.                   Brand Name Dispense Instructions for use Diagnosis    aspirin 81 MG tablet     90 tablet    Take 1 tablet (81 mg) by mouth daily    Type 2 diabetes mellitus without complication, without long-term current use of insulin (H)       atorvastatin 20 MG tablet    LIPITOR    90 tablet    Take 0.5 tablets (10 mg) by mouth daily For 1 week, then increase to 1 tab daily    Hyperlipidemia LDL goal <100       blood glucose lancets standard    no brand specified    1 Box    Use to test blood sugar 2 times daily or as directed.    Type 2 diabetes mellitus without  complication, without long-term current use of insulin (H)       blood glucose monitoring meter device kit     1 kit    Check sugars 2 times a day. May Substitute covered meter and supplies    Type 2 diabetes mellitus without complication, without long-term current use of insulin (H)       blood glucose monitoring test strip    no brand specified    100 strip    Use to test blood sugars 2 times daily or as directed for Christi or may substitute    Type 2 diabetes mellitus without complication, without long-term current use of insulin (H)       cephALEXin 500 MG capsule    KEFLEX    40 capsule    Take 1 capsule (500 mg) by mouth 4 times daily    Cellulitis of finger of left hand       glipiZIDE 5 MG 24 hr tablet    glipiZIDE XL    90 tablet    Take 1 tablet (5 mg) by mouth daily    Type 2 diabetes mellitus without complication, without long-term current use of insulin (H)       lisinopril 5 MG tablet    PRINIVIL/ZESTRIL    90 tablet    Take 1 tablet (5 mg) by mouth daily    Benign essential hypertension       metFORMIN 1000 MG tablet    GLUCOPHAGE    180 tablet    Take 1 tablet (1,000 mg) by mouth 2 times daily (with meals)    Type 2 diabetes mellitus without complication, without long-term current use of insulin (H)

## 2023-04-21 ENCOUNTER — OFFICE VISIT (OUTPATIENT)
Dept: FAMILY MEDICINE | Facility: CLINIC | Age: 58
End: 2023-04-21
Payer: COMMERCIAL

## 2023-04-21 VITALS
BODY MASS INDEX: 24.35 KG/M2 | OXYGEN SATURATION: 100 % | HEIGHT: 59 IN | SYSTOLIC BLOOD PRESSURE: 138 MMHG | DIASTOLIC BLOOD PRESSURE: 89 MMHG | RESPIRATION RATE: 16 BRPM | TEMPERATURE: 97.9 F | WEIGHT: 120.8 LBS | HEART RATE: 98 BPM

## 2023-04-21 DIAGNOSIS — R80.9 MICROALBUMINURIA: ICD-10-CM

## 2023-04-21 DIAGNOSIS — E11.9 TYPE 2 DIABETES MELLITUS WITHOUT COMPLICATION, WITHOUT LONG-TERM CURRENT USE OF INSULIN (H): Primary | ICD-10-CM

## 2023-04-21 DIAGNOSIS — I10 BENIGN ESSENTIAL HYPERTENSION: ICD-10-CM

## 2023-04-21 LAB
ALBUMIN SERPL BCG-MCNC: 4.8 G/DL (ref 3.5–5.2)
ALP SERPL-CCNC: 82 U/L (ref 35–104)
ALT SERPL W P-5'-P-CCNC: 32 U/L (ref 10–35)
ANION GAP SERPL CALCULATED.3IONS-SCNC: 17 MMOL/L (ref 7–15)
AST SERPL W P-5'-P-CCNC: 40 U/L (ref 10–35)
BILIRUB SERPL-MCNC: 0.8 MG/DL
BUN SERPL-MCNC: 21.2 MG/DL (ref 6–20)
CALCIUM SERPL-MCNC: 9.9 MG/DL (ref 8.6–10)
CHLORIDE SERPL-SCNC: 101 MMOL/L (ref 98–107)
CHOLEST SERPL-MCNC: 130 MG/DL
CREAT SERPL-MCNC: 0.65 MG/DL (ref 0.51–0.95)
CREAT UR-MCNC: 118 MG/DL
DEPRECATED HCO3 PLAS-SCNC: 20 MMOL/L (ref 22–29)
GFR SERPL CREATININE-BSD FRML MDRD: >90 ML/MIN/1.73M2
GLUCOSE SERPL-MCNC: 169 MG/DL (ref 70–99)
HBA1C MFR BLD: 7.9 % (ref 0–5.6)
HDLC SERPL-MCNC: 54 MG/DL
LDLC SERPL CALC-MCNC: 39 MG/DL
MICROALBUMIN UR-MCNC: 41.6 MG/L
MICROALBUMIN/CREAT UR: 35.25 MG/G CR (ref 0–25)
NONHDLC SERPL-MCNC: 76 MG/DL
POTASSIUM SERPL-SCNC: 4 MMOL/L (ref 3.4–5.3)
PROT SERPL-MCNC: 8.3 G/DL (ref 6.4–8.3)
SODIUM SERPL-SCNC: 138 MMOL/L (ref 136–145)
TRIGL SERPL-MCNC: 184 MG/DL

## 2023-04-21 PROCEDURE — 80061 LIPID PANEL: CPT | Performed by: FAMILY MEDICINE

## 2023-04-21 PROCEDURE — 80053 COMPREHEN METABOLIC PANEL: CPT | Performed by: FAMILY MEDICINE

## 2023-04-21 PROCEDURE — 36415 COLL VENOUS BLD VENIPUNCTURE: CPT | Performed by: FAMILY MEDICINE

## 2023-04-21 PROCEDURE — 99214 OFFICE O/P EST MOD 30 MIN: CPT | Performed by: FAMILY MEDICINE

## 2023-04-21 PROCEDURE — 82043 UR ALBUMIN QUANTITATIVE: CPT | Performed by: FAMILY MEDICINE

## 2023-04-21 PROCEDURE — 83036 HEMOGLOBIN GLYCOSYLATED A1C: CPT | Performed by: FAMILY MEDICINE

## 2023-04-21 PROCEDURE — 82570 ASSAY OF URINE CREATININE: CPT | Performed by: FAMILY MEDICINE

## 2023-04-21 NOTE — PROGRESS NOTES
Assessment & Plan     Type 2 diabetes mellitus without complication, without long-term current use of insulin (H)  Continue medication regimen of glipizide, Januvia and metformin  - Hemoglobin A1c  - Comprehensive metabolic panel  - Lipid Profile  - AK FOOT EXAM NO CHARGE      Microalbuminuria  - Albumin Random Urine Quantitative with Creat Ratio    Benign essential hypertension  Stable on lisinopril                   Return in about 6 months (around 10/21/2023) for Physical Exam/pap/pelvic.    Mary Almanza, DO  [facility]    Declined COVID19 booster today    Sonya Lopez is a 58 year old, presenting for the following health issues:  Diabetes (Dm/)      HPI     Diabetes Follow-up    How often are you checking your blood sugar? One time daily  What time of day are you checking your blood sugars (select all that apply)?  every morning  Have you had any blood sugars above 200?  No  Have you had any blood sugars below 70?  No    What concerns do you have today about your diabetes? None     Do you have any of these symptoms? (Select all that apply)  No numbness or tingling in feet.  No redness, sores or blisters on feet.  No complaints of excessive thirst.  No reports of blurry vision.  No significant changes to weight.    AM fasting 170s, 4sometimes after eating could be over 200        Hyperlipidemia Follow-Up      Are you regularly taking any medication or supplement to lower your cholesterol?   Yes- atorvastatin    Are you having muscle aches or other side effects that you think could be caused by your cholesterol lowering medication?  No    Hypertension Follow-up      Do you check your blood pressure regularly outside of the clinic? Yes     Are you following a low salt diet? No    Are your blood pressures ever more than 140 on the top number (systolic) OR more   than 90 on the bottom number (diastolic), for example 140/90? No    BP Readings from Last 2 Encounters:   04/21/23  "138/89   09/16/22 123/77     Hemoglobin A1C (%)   Date Value   04/21/2023 7.9 (H)   09/16/2022 7.6 (H)   03/08/2021 7.7 (H)   07/30/2020 8.1 (H)     LDL Cholesterol Calculated (mg/dL)   Date Value   09/16/2022 48   08/23/2021 50   01/30/2017 63   03/23/2016 89     LDL Cholesterol Direct (mg/dL)   Date Value   07/30/2020 56.0   03/15/2019 47.0         How many servings of fruits and vegetables do you eat daily?  0-1, does not eat vegetables or fruits often    On average, how many sweetened beverages do you drink each day (Examples: soda, juice, sweet tea, etc.  Do NOT count diet or artificially sweetened beverages)?   0    Eats a lot of rice and meat- chicken, sometimes fish and pork and beef      How many days per week do you exercise enough to make your heart beat faster? 3 or less, walks on a machine at home, arms move with feet, 10-15 mins, has many grandkids to take care of            Review of Systems         Objective    /89   Pulse 98   Temp 97.9  F (36.6  C) (Oral)   Resp 16   Ht 1.49 m (4' 10.66\")   Wt 54.8 kg (120 lb 12.8 oz)   SpO2 100%   BMI 24.68 kg/m    Body mass index is 24.68 kg/m .  Physical Exam   GENERAL: healthy, alert and no distress  EYES: Eyes grossly normal to inspection, PERRL and conjunctivae and sclerae normal  HENT: ear canals and TM's normal, nose and mouth without ulcers or lesions  NECK: no adenopathy, no asymmetry, masses, or scars and thyroid normal to palpation  RESP: lungs clear to auscultation - no rales, rhonchi or wheezes  CV: regular rate and rhythm, normal S1 S2, no S3 or S4, no murmur, click or rub, no peripheral edema and peripheral pulses strong  MS: no gross musculoskeletal defects noted, no edema  Diabetic foot exam: normal DP and PT pulses, no trophic changes or ulcerative lesions and normal sensory exam                    "

## 2023-04-21 NOTE — LETTER
May 19, 2023      John Chun  [address] [place] [address]        Dear ,    We are writing to inform you of your test results.    Your lab results are stable. Your A1C was slightly higher at this last visit, so please try to incorporate more exercise into your day. There is some protein in the urine and this means your kidneys are affected by your diabetes. The lisinopril helps to protect your kidneys, we may need to increase this medication in the future.     Resulted Orders   Hemoglobin A1c   Result Value Ref Range    Hemoglobin A1C 7.9 (H) 0.0 - 5.6 %      Comment:      Normal <5.7%   Prediabetes 5.7-6.4%    Diabetes 6.5% or higher     Note: Adopted from ADA consensus guidelines.   Comprehensive metabolic panel   Result Value Ref Range    Sodium 138 136 - 145 mmol/L    Potassium 4.0 3.4 - 5.3 mmol/L    Chloride 101 98 - 107 mmol/L    Carbon Dioxide (CO2) 20 (L) 22 - 29 mmol/L    Anion Gap 17 (H) 7 - 15 mmol/L    Urea Nitrogen 21.2 (H) 6.0 - 20.0 mg/dL    Creatinine 0.65 0.51 - 0.95 mg/dL    Calcium 9.9 8.6 - 10.0 mg/dL    Glucose 169 (H) 70 - 99 mg/dL    Alkaline Phosphatase 82 35 - 104 U/L    AST 40 (H) 10 - 35 U/L    ALT 32 10 - 35 U/L    Protein Total 8.3 6.4 - 8.3 g/dL    Albumin 4.8 3.5 - 5.2 g/dL    Bilirubin Total 0.8 <=1.2 mg/dL    GFR Estimate >90 >60 mL/min/1.73m2      Comment:      eGFR calculated using 2021 CKD-EPI equation.   Lipid Profile   Result Value Ref Range    Cholesterol 130 <200 mg/dL    Triglycerides 184 (H) <150 mg/dL    Direct Measure HDL 54 >=50 mg/dL    LDL Cholesterol Calculated 39 <=100 mg/dL    Non HDL Cholesterol 76 <130 mg/dL    Narrative    Cholesterol  Desirable:  <200 mg/dL    Triglycerides  Normal:  Less than 150 mg/dL  Borderline High:  150-199 mg/dL  High:  200-499 mg/dL  Very High:  Greater than or equal to 500 mg/dL    Direct Measure HDL  Female:  Greater than or equal to 50 mg/dL   Male:  Greater than or equal to 40 mg/dL    LDL Cholesterol  Desirable:   <100mg/dL  Above Desirable:  100-129 mg/dL   Borderline High:  130-159 mg/dL   High:  160-189 mg/dL   Very High:  >= 190 mg/dL    Non HDL Cholesterol  Desirable:  130 mg/dL  Above Desirable:  130-159 mg/dL  Borderline High:  160-189 mg/dL  High:  190-219 mg/dL  Very High:  Greater than or equal to 220 mg/dL   Albumin Random Urine Quantitative with Creat Ratio   Result Value Ref Range    Creatinine Urine mg/dL 118.0 mg/dL      Comment:      The reference ranges have not been established in urine creatinine. The results should be integrated into the clinical context for interpretation.    Albumin Urine mg/L 41.6 mg/L      Comment:      The reference ranges have not been established in urine albumin. The results should be integrated into the clinical context for interpretation.    Albumin Urine mg/g Cr 35.25 (H) 0.00 - 25.00 mg/g Cr      Comment:      Microalbuminuria is defined as an albumin:creatinine ratio of 17 to 299 for males and 25 to 299 for females. A ratio of albumin:creatinine of 300 or higher is indicative of overt proteinuria.  Due to biologic variability, positive results should be confirmed by a second, first-morning random or 24-hour timed urine specimen. If there is discrepancy, a third specimen is recommended. When 2 out of 3 results are in the microalbuminuria range, this is evidence for incipient nephropathy and warrants increased efforts at glucose control, blood pressure control, and institution of therapy with an angiotensin-converting-enzyme (ACE) inhibitor (if the patient can tolerate it).         If you have any questions or concerns, please call the clinic at the number listed above.       Sincerely,      Mary Almanza, DO

## 2023-09-14 DIAGNOSIS — E11.9 TYPE 2 DIABETES MELLITUS WITHOUT COMPLICATION, WITHOUT LONG-TERM CURRENT USE OF INSULIN (H): ICD-10-CM

## 2023-09-14 DIAGNOSIS — I10 BENIGN ESSENTIAL HYPERTENSION: ICD-10-CM

## 2023-09-15 RX ORDER — METFORMIN HCL 500 MG
500 TABLET, EXTENDED RELEASE 24 HR ORAL
Qty: 90 TABLET | Refills: 3 | Status: SHIPPED | OUTPATIENT
Start: 2023-09-15 | End: 2023-09-28

## 2023-09-15 RX ORDER — LISINOPRIL 5 MG/1
5 TABLET ORAL DAILY
Qty: 90 TABLET | Refills: 3 | Status: SHIPPED | OUTPATIENT
Start: 2023-09-15 | End: 2024-03-22

## 2023-09-28 ENCOUNTER — OFFICE VISIT (OUTPATIENT)
Dept: FAMILY MEDICINE | Facility: CLINIC | Age: 58
End: 2023-09-28
Payer: COMMERCIAL

## 2023-09-28 VITALS
HEART RATE: 79 BPM | RESPIRATION RATE: 16 BRPM | TEMPERATURE: 98 F | BODY MASS INDEX: 23.16 KG/M2 | WEIGHT: 118 LBS | DIASTOLIC BLOOD PRESSURE: 78 MMHG | HEIGHT: 60 IN | SYSTOLIC BLOOD PRESSURE: 121 MMHG | OXYGEN SATURATION: 99 %

## 2023-09-28 DIAGNOSIS — Z23 NEED FOR PROPHYLACTIC VACCINATION AND INOCULATION AGAINST INFLUENZA: ICD-10-CM

## 2023-09-28 DIAGNOSIS — E11.9 TYPE 2 DIABETES MELLITUS WITHOUT COMPLICATION, WITHOUT LONG-TERM CURRENT USE OF INSULIN (H): Primary | ICD-10-CM

## 2023-09-28 DIAGNOSIS — E78.5 HYPERLIPIDEMIA LDL GOAL <100: ICD-10-CM

## 2023-09-28 LAB
ALBUMIN SERPL BCG-MCNC: 4.4 G/DL (ref 3.5–5.2)
ALP SERPL-CCNC: 80 U/L (ref 35–104)
ALT SERPL W P-5'-P-CCNC: 27 U/L (ref 0–50)
ANION GAP SERPL CALCULATED.3IONS-SCNC: 14 MMOL/L (ref 7–15)
AST SERPL W P-5'-P-CCNC: 24 U/L (ref 0–45)
BILIRUB SERPL-MCNC: 0.3 MG/DL
BUN SERPL-MCNC: 16.8 MG/DL (ref 6–20)
CALCIUM SERPL-MCNC: 9.6 MG/DL (ref 8.6–10)
CHLORIDE SERPL-SCNC: 98 MMOL/L (ref 98–107)
CREAT SERPL-MCNC: 0.55 MG/DL (ref 0.51–0.95)
EGFRCR SERPLBLD CKD-EPI 2021: >90 ML/MIN/1.73M2
GLUCOSE SERPL-MCNC: 211 MG/DL (ref 70–99)
HBA1C MFR BLD: 8.4 % (ref 0–5.6)
HCO3 SERPL-SCNC: 22 MMOL/L (ref 22–29)
POTASSIUM SERPL-SCNC: 4.1 MMOL/L (ref 3.4–5.3)
PROT SERPL-MCNC: 7.5 G/DL (ref 6.4–8.3)
SODIUM SERPL-SCNC: 134 MMOL/L (ref 135–145)

## 2023-09-28 PROCEDURE — 99214 OFFICE O/P EST MOD 30 MIN: CPT | Mod: 25 | Performed by: FAMILY MEDICINE

## 2023-09-28 PROCEDURE — 90682 RIV4 VACC RECOMBINANT DNA IM: CPT | Performed by: FAMILY MEDICINE

## 2023-09-28 PROCEDURE — 36415 COLL VENOUS BLD VENIPUNCTURE: CPT | Performed by: FAMILY MEDICINE

## 2023-09-28 PROCEDURE — 80053 COMPREHEN METABOLIC PANEL: CPT | Performed by: FAMILY MEDICINE

## 2023-09-28 PROCEDURE — 83036 HEMOGLOBIN GLYCOSYLATED A1C: CPT | Performed by: FAMILY MEDICINE

## 2023-09-28 PROCEDURE — 90471 IMMUNIZATION ADMIN: CPT | Performed by: FAMILY MEDICINE

## 2023-09-28 RX ORDER — ATORVASTATIN CALCIUM 20 MG/1
10 TABLET, FILM COATED ORAL DAILY
Qty: 90 TABLET | Refills: 3 | Status: SHIPPED | OUTPATIENT
Start: 2023-09-28 | End: 2024-03-22

## 2023-09-28 RX ORDER — METFORMIN HCL 500 MG
1000 TABLET, EXTENDED RELEASE 24 HR ORAL
Qty: 90 TABLET | Refills: 3
Start: 2023-09-28 | End: 2023-10-27

## 2023-09-28 RX ORDER — METFORMIN HCL 500 MG
500 TABLET, EXTENDED RELEASE 24 HR ORAL
Qty: 90 TABLET | Refills: 3 | Status: CANCELLED | OUTPATIENT
Start: 2023-09-28

## 2023-09-28 RX ORDER — GLIPIZIDE 10 MG/1
10 TABLET, FILM COATED, EXTENDED RELEASE ORAL DAILY
Qty: 90 TABLET | Refills: 3 | Status: SHIPPED | OUTPATIENT
Start: 2023-09-28 | End: 2024-03-22

## 2023-09-28 NOTE — PROGRESS NOTES
Assessment & Plan     Type 2 diabetes mellitus without complication, without long-term current use of insulin (H)    If her A1C is elevated, would recommend increasing her metformin. She has been busy with her garden so has not had a lot of time to exericse.    - Refill glipiZIDE (GLIPIZIDE XL) 10 MG 24 hr tablet; Take 1 tablet (10 mg) by mouth daily  - Refill sitagliptin (JANUVIA) 50 MG tablet; Take 1 tablet (50 mg) by mouth daily    - Hemoglobin A1c  - Comprehensive metabolic panel    Encouraged eye exam    Hyperlipidemia LDL goal <100    -Refill atorvastatin (LIPITOR) 20 MG tablet; Take 0.5 tablets (10 mg) by mouth daily For 1 week, then increase to 1 tab daily    Need for prophylactic vaccination and inoculation against influenza  Flu vaccine offered and accepted                 Return in about 4 weeks (around 10/26/2023) for Routine Visit.    Mary Almanza DO [facility] Lopez is a 58 year old, presenting for the following health issues:  Diabetes and Imm/Inj (Flu Shot)      HPI     Eye doctor was 1 year ago due again  Busy in garden      Diabetes Follow-up    How often are you checking your blood sugar? 1-2 times per day  What concerns do you have today about your diabetes? Other: Higher blood sugars    Do you have any of these symptoms? (Select all that apply)  No numbness or tingling in feet.  No redness, sores or blisters on feet.  No complaints of excessive thirst.  No reports of blurry vision.  No significant changes to weight.  Have you had a diabetic eye exam in the last 12 months? No        BP Readings from Last 2 Encounters:   09/28/23 121/78   04/21/23 138/89     Hemoglobin A1C (%)   Date Value   09/28/2023 8.4 (H)   04/21/2023 7.9 (H)   03/08/2021 7.7 (H)   07/30/2020 8.1 (H)     LDL Cholesterol Calculated (mg/dL)   Date Value   04/21/2023 39   09/16/2022 48   01/30/2017 63   03/23/2016 89     LDL Cholesterol Direct (mg/dL)   Date Value   07/30/2020  "56.0   03/15/2019 47.0         Checking blood sugars in am and sometimes in at night. In AM 60-80, at night depends on foods eaten but never over 300. She is taking her oral medications regularly    Review of Systems         Objective    /78   Pulse 79   Temp 98  F (36.7  C) (Oral)   Resp 16   Ht 1.511 m (4' 11.5\")   Wt 53.5 kg (118 lb)   SpO2 99%   BMI 23.43 kg/m    Body mass index is 23.43 kg/m .  Physical Exam   GENERAL: healthy, alert and no distress  NECK: no adenopathy, no asymmetry, masses, or scars and thyroid normal to palpation  RESP: lungs clear to auscultation - no rales, rhonchi or wheezes  CV: regular rate and rhythm, normal S1 S2, no S3 or S4, no murmur, click or rub, no peripheral edema and peripheral pulses strong  MS: no gross musculoskeletal defects noted, no edema                      "

## 2023-09-29 NOTE — PROGRESS NOTES
Please call patient with below.  Labs reviewed.  A1C is elevated at 8.4%. Kidney function was good but your blood sugars was 211. High like you mentioned you have noticed at home.     Recommend to increase your metformin to 2 tabs a day. You can take them at the same time. We will review how you are doing with 2 tablets of metformin when you Return to clinic for your physical exam.

## 2023-10-25 NOTE — PROGRESS NOTES
SUBJECTIVE:   CC: John is an 58 year old who presents for preventive health visit.       10/27/2023     8:25 AM   Additional Questions   Roomed by Nedra         Healthy Habits:     Getting at least 3 servings of Calcium per day:  Yes    Bi-annual eye exam:  NO    Dental care twice a year:  NO    Sleep apnea or symptoms of sleep apnea:  None    Diet:  Diabetic    Frequency of exercise:  2-3 days/week    Duration of exercise:  Greater than 60 minutes    Taking medications regularly:  Yes    Barriers to taking medications:  Cost of medication    Medication side effects:  None    Additional concerns today:  No    Was just at the dentist on 10-6-2023    Today's PHQ-2 Score:       10/27/2023     8:22 AM   PHQ-2 ( 1999 [employer])   Q1: Little interest or pleasure in doing things 0   Q2: Feeling down, depressed or hopeless 0   PHQ-2 Score 0   Q1: Little interest or pleasure in doing things Not at all   Q2: Feeling down, depressed or hopeless Not at all   PHQ-2 Score 0                   Have you ever done Advance Care Planning? (For example, a Health Directive, POLST, or a discussion with a medical provider or your loved ones about your wishes): No, advance care planning information given to patient to review.  Patient declined advance care planning discussion at this time.    Social History     Tobacco Use    Smoking status: Never    Smokeless tobacco: Never   Substance Use Topics    Alcohol use: No             10/27/2023     8:22 AM   Alcohol Use   Prescreen: >3 drinks/day or >7 drinks/week? No     Reviewed orders with patient.  Reviewed health maintenance and updated orders accordingly - Yes      Breast Cancer Screening: Last done 11/2022  Any new diagnosis of family breast, ovarian, or bowel cancer? No    FHS-7:       10/12/2021     9:26 AM 11/7/2022    11:01 AM   Breast CA Risk Assessment (FHS-7)   Did any of your first-degree relatives have breast or ovarian cancer? No No   Did any of your relatives have bilateral  breast cancer? No No   Did any man in your family have breast cancer? No No   Did any woman in your family have breast and ovarian cancer? No No   Did any woman in your family have breast cancer before age 50 y? No No   Do you have 2 or more relatives with breast and/or ovarian cancer? No No   Do you have 2 or more relatives with breast and/or bowel cancer? No No         Pertinent mammograms are reviewed under the imaging tab.    History of abnormal Pap smear: NO - age 30-65 PAP every 5 years with negative HPV co-testing recommended      Latest Ref Rng & Units 12/17/2018    12:05 PM 5/19/2015    12:00 AM   PAP / HPV   PAP (Historical)   NIL    HPV 16 DNA NEG Negative     HPV 18 DNA NEG Negative     Other HR HPV NEG Negative       Reviewed and updated as needed this visit by clinical staff   Tobacco  Allergies  Meds  Problems  Med Hx  Surg Hx  Fam Hx          Reviewed and updated as needed this visit by Provider   Tobacco  Allergies  Meds  Problems  Med Hx  Surg Hx  Fam Hx             Diabetes: Since her last visit she did not increase to 2 tabs of metformin and has not been able to  her Januvia, so she has been out for 1 month. She has been checking her blood sugars and have been elevated.  She plans to schedule her eye exam.    Review of Systems   Constitutional:  Negative for chills and fever.   HENT:  Negative for congestion, ear pain, hearing loss and sore throat.    Eyes:  Negative for pain and visual disturbance.   Respiratory:  Negative for cough and shortness of breath.    Cardiovascular:  Negative for chest pain, palpitations and peripheral edema.   Gastrointestinal:  Negative for abdominal pain, constipation, diarrhea, heartburn, hematochezia and nausea.   Breasts:  Negative for tenderness, breast mass and discharge.   Genitourinary:  Positive for dysuria and frequency. Negative for genital sores, hematuria, pelvic pain, urgency, vaginal bleeding and vaginal discharge.  "  Musculoskeletal:  Negative for arthralgias, joint swelling and myalgias.   Skin:  Negative for rash.   Neurological:  Negative for dizziness, weakness, headaches and paresthesias.   Psychiatric/Behavioral:  Negative for mood changes. The patient is not nervous/anxious.           OBJECTIVE:   /80 (BP Location: Right arm, Patient Position: Sitting, Cuff Size: Adult Regular)   Pulse 73   Ht 1.5 m (4' 11.06\")   Wt 53.1 kg (117 lb)   SpO2 100%   BMI 23.59 kg/m    Physical Exam  GENERAL APPEARANCE: healthy, alert and no distress  EYES: Eyes grossly normal to inspection, PERRL and conjunctivae and sclerae normal  HENT: ear canals and TM's normal, nose and mouth without ulcers or lesions, oropharynx clear and oral mucous membranes moist  NECK: no adenopathy, no asymmetry, masses, or scars and thyroid normal to palpation  RESP: lungs clear to auscultation - no rales, rhonchi or wheezes  CV: regular rate and rhythm, normal S1 S2, no S3 or S4, no murmur, click or rub, no peripheral edema and peripheral pulses strong  ABDOMEN: soft, nontender, no hepatosplenomegaly, no masses and bowel sounds normal  MS: no musculoskeletal defects are noted and gait is age appropriate without ataxia  SKIN: no suspicious lesions or rashes  GYN: Normal external genitalia. Normal appearing cervix. No abnormal vaginal discharge.  NEURO: Normal strength and tone, sensory exam grossly normal, mentation intact and speech normal  PSYCH: mentation appears normal and affect normal/bright        ASSESSMENT/PLAN:   (Z00.00) Routine general medical examination at a health care facility  (primary encounter diagnosis)  Plan: Up to date with Mammogram. Declined COVID19 vaccine.  Continue with healthy eating and incorporating exercise.    (E11.65) Type 2 diabetes mellitus with hyperglycemia, without long-term current use of insulin (H)  Comment: Blood sugars and A1C are elevated.  Plan:   Please make an appointment with [facility]  Appointment number- [phone]  You saw Dr. Orantes last time.    Please increase your metformin to 2 tabs daily with your evening meal.   Please take you Januvia and glipizide in the morning.    Return to clinic 12- for diabetes mellitus visit    Let me know if you are unable to  your Januvia at [facility].    (Z12.11) Screen for colon cancer  Plan: Fecal colorectal cancer screen FIT - Future         (S+30)    (Z12.4) Cervical cancer screening  Plan: Pap Screen with HPV - recommended age 30 - 65 years                COUNSELING:  Reviewed preventive health counseling, as reflected in patient instructions        She reports that she has never smoked. She has never used smokeless tobacco.          Mary Almanza DO  [facility]

## 2023-10-27 ENCOUNTER — OFFICE VISIT (OUTPATIENT)
Dept: FAMILY MEDICINE | Facility: CLINIC | Age: 58
End: 2023-10-27
Payer: COMMERCIAL

## 2023-10-27 VITALS
OXYGEN SATURATION: 100 % | HEIGHT: 59 IN | SYSTOLIC BLOOD PRESSURE: 128 MMHG | WEIGHT: 117 LBS | BODY MASS INDEX: 23.59 KG/M2 | HEART RATE: 73 BPM | DIASTOLIC BLOOD PRESSURE: 80 MMHG

## 2023-10-27 DIAGNOSIS — Z12.11 SCREEN FOR COLON CANCER: ICD-10-CM

## 2023-10-27 DIAGNOSIS — Z00.00 ROUTINE GENERAL MEDICAL EXAMINATION AT A HEALTH CARE FACILITY: Primary | ICD-10-CM

## 2023-10-27 DIAGNOSIS — Z12.4 CERVICAL CANCER SCREENING: ICD-10-CM

## 2023-10-27 DIAGNOSIS — E11.65 TYPE 2 DIABETES MELLITUS WITH HYPERGLYCEMIA, WITHOUT LONG-TERM CURRENT USE OF INSULIN (H): ICD-10-CM

## 2023-10-27 PROCEDURE — 87624 HPV HI-RISK TYP POOLED RSLT: CPT | Performed by: FAMILY MEDICINE

## 2023-10-27 PROCEDURE — 99396 PREV VISIT EST AGE 40-64: CPT | Mod: 25 | Performed by: FAMILY MEDICINE

## 2023-10-27 PROCEDURE — 99214 OFFICE O/P EST MOD 30 MIN: CPT | Performed by: FAMILY MEDICINE

## 2023-10-27 PROCEDURE — G0123 SCREEN CERV/VAG THIN LAYER: HCPCS | Performed by: FAMILY MEDICINE

## 2023-10-27 RX ORDER — METFORMIN HCL 500 MG
1000 TABLET, EXTENDED RELEASE 24 HR ORAL
Qty: 180 TABLET | Refills: 3 | Status: SHIPPED | OUTPATIENT
Start: 2023-10-27 | End: 2024-03-22

## 2023-10-27 ASSESSMENT — ENCOUNTER SYMPTOMS
ARTHRALGIAS: 0
DIZZINESS: 0
EYE PAIN: 0
DYSURIA: 1
CONSTIPATION: 0
HEMATOCHEZIA: 0
NAUSEA: 0
HEMATURIA: 0
PALPITATIONS: 0
FEVER: 0
SHORTNESS OF BREATH: 0
SORE THROAT: 0
DIARRHEA: 0
JOINT SWELLING: 0
NERVOUS/ANXIOUS: 0
HEADACHES: 0
MYALGIAS: 0
HEARTBURN: 0
COUGH: 0
ABDOMINAL PAIN: 0
WEAKNESS: 0
PARESTHESIAS: 0
FREQUENCY: 1
BREAST MASS: 0
CHILLS: 0

## 2023-10-27 NOTE — LETTER
November 3, 2023      John Chun  [address] [place] [address]        Dear ,    We are writing to inform you of your test results.    Pap test is normal, will repeat in 5 years.     Resulted Orders   Pap Screen with HPV - recommended age 30 - 65 years   Result Value Ref Range    Interpretation        Negative for Intraepithelial Lesion or Malignancy (NILM)    Comment         Papanicolaou Test Limitations:  Cervical cytology is a screening test with limited sensitivity, and regular screening is critical for cancer prevention.  Pap tests are primarily effective for the diagnosis/prevention of squamous cell carcinoma, not adenocarcinoma or other cancers.        Specimen Adequacy       Satisfactory for evaluation.  Specimen was unable to be imaged on the [employer]ide .  Transformation zone component present    Clinical Information       none      Reflex Testing Yes regardless of result     Previous Abnormal?       No      Performing Labs       The technical component of this testing was completed at [facility]     HPV High Risk Types DNA Cervical   Result Value Ref Range    Other HR HPV Negative Negative    HPV16 DNA Negative Negative    HPV18 DNA Negative Negative    FINAL DIAGNOSIS       This patient's sample is negative for HPV DNA.        This test was developed and its performance characteristics determined by the [facility]. It has not been cleared or approved by the FDA. The laboratory is regulated under CLIA as qualified to perform high-complexity testing. This test is used for clinical purposes. It should not be regarded as investigational or for research.    METHODOLOGY: The Roche Yesenia 4800 system uses automated extraction, simultaneous amplification of HPV (L1 region) and beta-globin, followed by real time detection of fluorescent labeled HPV and beta globin  using specific oligonucleotide probes. The test specifically identifies types HPV 16 DNA and HPV 18 DNA while concurrently detecting the rest of the high risk types (31, 33, 35, 39, 45, 51, 52, 56, 58, 59, 66 or 68).    COMMENTS: This test is not intended for use as a screening device for woman under age 30 with normal cervical cytology. Results should be correlated with cytologic and histologic findings. Close clinical followup is recommended.           If you have any questions or concerns, please call the clinic at the number listed above.       Sincerely,      Mary Almanza, DO

## 2023-10-27 NOTE — PATIENT INSTRUCTIONS
Please make an appointment with [facility]  Appointment number- [phone]  You saw Dr. Orantes last time.    Please increase your metformin to 2 tabs daily with your evening meal.   Please take you Januvia and glipizide in the morning.    Let me know if you are unable to  your Januvia at [facility].      Preventive Health Recommendations  Female Ages 50 - 64    Yearly exam: See your health care provider every year in order to  Review health changes.   Discuss preventive care.    Review your medicines if your doctor has prescribed any.    Get a Pap test every three years (unless you have an abnormal result and your provider advises testing more often).  If you get Pap tests with HPV test, you only need to test every 5 years, unless you have an abnormal result.   You do not need a Pap test if your uterus was removed (hysterectomy) and you have not had cancer.  You should be tested each year for STDs (sexually transmitted diseases) if you're at risk.   Have a mammogram every 1 to 2 years.  Have a colonoscopy at age 45, or have a yearly FIT test (stool test). These exams screen for colon cancer.    Have a cholesterol test every 5 years, or more often if advised.  Have a diabetes test (fasting glucose) every three years. If you are at risk for diabetes, you should have this test more often.   If you are at risk for osteoporosis (brittle bone disease), think about having a bone density scan (DEXA).    Shots: Get a flu shot each year. Get a tetanus shot every 10 years.    Nutrition:   Eat at least 5 servings of fruits and vegetables each day.  Eat whole-grain bread, whole-wheat pasta and brown rice instead of white grains and rice.  Get adequate Calcium and Vitamin D.     Lifestyle  Exercise at least 150 minutes a week (30 minutes a day, 5 days a week). This will help you control your weight and prevent disease.  Limit alcohol to one drink per day.  No smoking.   Wear sunscreen to prevent skin cancer.   See  your dentist every six months for an exam and cleaning.  See your eye doctor every 1 to 2 years.

## 2023-11-01 LAB
BKR LAB AP GYN ADEQUACY: NORMAL
BKR LAB AP GYN INTERPRETATION: NORMAL
BKR LAB AP HPV REFLEX: NORMAL
BKR LAB AP PREVIOUS ABNORMAL: NORMAL
PATH REPORT.COMMENTS IMP SPEC: NORMAL
PATH REPORT.COMMENTS IMP SPEC: NORMAL
PATH REPORT.RELEVANT HX SPEC: NORMAL

## 2023-11-02 LAB
HUMAN PAPILLOMA VIRUS 16 DNA: NEGATIVE
HUMAN PAPILLOMA VIRUS 18 DNA: NEGATIVE
HUMAN PAPILLOMA VIRUS FINAL DIAGNOSIS: NORMAL
HUMAN PAPILLOMA VIRUS OTHER HR: NEGATIVE

## 2023-11-02 PROCEDURE — 82274 ASSAY TEST FOR BLOOD FECAL: CPT | Performed by: FAMILY MEDICINE

## 2023-11-06 LAB — HEMOCCULT STL QL IA: NEGATIVE

## 2023-11-20 LAB
RETINOPATHY: NORMAL
RETINOPATHY: NORMAL

## 2023-12-19 DIAGNOSIS — E11.9 TYPE 2 DIABETES MELLITUS WITHOUT COMPLICATION, WITHOUT LONG-TERM CURRENT USE OF INSULIN (H): ICD-10-CM

## 2023-12-19 NOTE — TELEPHONE ENCOUNTER
Message to physician:     Date of last visit: 10/27/2023    Date of next visit if scheduled:     Potassium   Date Value Ref Range Status   09/28/2023 4.1 3.4 - 5.3 mmol/L Final   05/16/2022 4.2 3.5 - 5.0 mmol/L Final   03/08/2021 4.9 3.4 - 5.3 mmol/L Final     Creatinine   Date Value Ref Range Status   09/28/2023 0.55 0.51 - 0.95 mg/dL Final   03/08/2021 0.6 0.6 - 1.3 mg/dL Final     GFR Estimate   Date Value Ref Range Status   09/28/2023 >90 >60 mL/min/1.73m2 Final   01/30/2017 >60 >60 mL/min/1.73m2 Final       BP Readings from Last 3 Encounters:   10/27/23 128/80   09/28/23 121/78   04/21/23 138/89       Hemoglobin A1C   Date Value Ref Range Status   09/28/2023 8.4 (H) 0.0 - 5.6 % Final     Comment:     Normal <5.7%   Prediabetes 5.7-6.4%    Diabetes 6.5% or higher     Note: Adopted from ADA consensus guidelines.   03/08/2021 7.7 (H) 4.1 - 5.7 % Final       Please complete refill and CLOSE ENCOUNTER.  Closing the encounter signifies the refill is complete.

## 2023-12-26 RX ORDER — BLOOD-GLUCOSE METER
KIT MISCELLANEOUS
Qty: 1 KIT | Refills: 0 | Status: SHIPPED | OUTPATIENT
Start: 2023-12-26

## 2024-01-19 DIAGNOSIS — E11.9 TYPE 2 DIABETES MELLITUS WITHOUT COMPLICATION, WITHOUT LONG-TERM CURRENT USE OF INSULIN (H): ICD-10-CM

## 2024-03-22 ENCOUNTER — OFFICE VISIT (OUTPATIENT)
Dept: FAMILY MEDICINE | Facility: CLINIC | Age: 59
End: 2024-03-22
Payer: COMMERCIAL

## 2024-03-22 VITALS
HEART RATE: 83 BPM | SYSTOLIC BLOOD PRESSURE: 130 MMHG | BODY MASS INDEX: 24.19 KG/M2 | WEIGHT: 120 LBS | DIASTOLIC BLOOD PRESSURE: 82 MMHG | RESPIRATION RATE: 16 BRPM | TEMPERATURE: 98 F | HEIGHT: 59 IN | OXYGEN SATURATION: 99 %

## 2024-03-22 DIAGNOSIS — E11.65 TYPE 2 DIABETES MELLITUS WITH HYPERGLYCEMIA, WITHOUT LONG-TERM CURRENT USE OF INSULIN (H): Primary | ICD-10-CM

## 2024-03-22 DIAGNOSIS — I10 BENIGN ESSENTIAL HYPERTENSION: ICD-10-CM

## 2024-03-22 DIAGNOSIS — E78.5 HYPERLIPIDEMIA LDL GOAL <100: ICD-10-CM

## 2024-03-22 LAB
CREAT UR-MCNC: 41.6 MG/DL
HBA1C MFR BLD: 10 % (ref 0–5.6)
MICROALBUMIN UR-MCNC: <12 MG/L
MICROALBUMIN/CREAT UR: NORMAL MG/G{CREAT}

## 2024-03-22 PROCEDURE — 99214 OFFICE O/P EST MOD 30 MIN: CPT | Performed by: FAMILY MEDICINE

## 2024-03-22 PROCEDURE — 82570 ASSAY OF URINE CREATININE: CPT | Performed by: FAMILY MEDICINE

## 2024-03-22 PROCEDURE — 80061 LIPID PANEL: CPT | Performed by: FAMILY MEDICINE

## 2024-03-22 PROCEDURE — 36415 COLL VENOUS BLD VENIPUNCTURE: CPT | Performed by: FAMILY MEDICINE

## 2024-03-22 PROCEDURE — 82043 UR ALBUMIN QUANTITATIVE: CPT | Performed by: FAMILY MEDICINE

## 2024-03-22 PROCEDURE — 80053 COMPREHEN METABOLIC PANEL: CPT | Performed by: FAMILY MEDICINE

## 2024-03-22 PROCEDURE — 99207 PR FOOT EXAM NO CHARGE: CPT | Performed by: FAMILY MEDICINE

## 2024-03-22 PROCEDURE — 83036 HEMOGLOBIN GLYCOSYLATED A1C: CPT | Performed by: FAMILY MEDICINE

## 2024-03-22 RX ORDER — LISINOPRIL 5 MG/1
5 TABLET ORAL DAILY
Qty: 90 TABLET | Refills: 3 | Status: SHIPPED | OUTPATIENT
Start: 2024-03-22

## 2024-03-22 RX ORDER — METFORMIN HCL 500 MG
1000 TABLET, EXTENDED RELEASE 24 HR ORAL 2 TIMES DAILY WITH MEALS
Qty: 360 TABLET | Refills: 3 | Status: SHIPPED | OUTPATIENT
Start: 2024-03-22

## 2024-03-22 RX ORDER — GLIPIZIDE 10 MG/1
10 TABLET, FILM COATED, EXTENDED RELEASE ORAL DAILY
Qty: 90 TABLET | Refills: 3 | Status: SHIPPED | OUTPATIENT
Start: 2024-03-22

## 2024-03-22 RX ORDER — ATORVASTATIN CALCIUM 20 MG/1
20 TABLET, FILM COATED ORAL DAILY
Qty: 90 TABLET | Refills: 3 | Status: SHIPPED | OUTPATIENT
Start: 2024-03-22

## 2024-03-22 NOTE — PROGRESS NOTES
Assessment & Plan     Type 2 diabetes mellitus with hyperglycemia, without long-term current use of insulin (H)  Her A1C today is elevated. She has been taking her oral medications. Discussed injectable medications but she does not want to start an injectable. Will trial an increase in her medications but doubtful will reduce her A1C to a normal range. She will also review her tea she has been drinking to see how much sugar there might be in it.    - ME FOOT EXAM NO CHARGE  - Hemoglobin A1c  - Comprehensive metabolic panel  - Lipid Profile  - Albumin Random Urine Quantitative with Creat Ratio  - Increase metFORMIN (GLUCOPHAGE XR) 500 MG 24 hr tablet  Dispense: 360 tablet; Refill: 3  - blood glucose (NO BRAND SPECIFIED) test strip  Dispense: 100 strip; Refill: 3  - Continue glipiZIDE (GLIPIZIDE XL) 10 MG 24 hr tablet  Dispense: 90 tablet; Refill: 3  - Increase sitagliptin (JANUVIA) 100 MG tablet  Dispense: 90 tablet; Refill: 1    Benign essential hypertension  - lisinopril (ZESTRIL) 5 MG tablet  Dispense: 90 tablet; Refill: 3    Hyperlipidemia LDL goal <100  - atorvastatin (LIPITOR) 20 MG tablet  Dispense: 90 tablet; Refill: 3                    Return in about 3 months (around 6/22/2024) for DM recheck.    Sonya Lopez is a 58 year old, presenting for the following health issues:  Diabetes (Dm check/) and Refill Request (refill)        3/22/2024    10:15 AM   Additional Questions   Roomed by raghavendra         3/22/2024    Information    services provided? No     HPI       Diabetes Follow-up    How often are you checking your blood sugar? One time daily  What time of day are you checking your blood sugars (select all that apply)?  Before meals. Usually in the 80s.  Have you had any blood sugars above 200?  No  Have you had any blood sugars below 70?  No  What symptoms do you notice when your blood sugar is low?  None and Not applicable  What concerns do you have today about your diabetes?  "None   Do you have any of these symptoms? (Select all that apply)  No numbness or tingling in feet.  No redness, sores or blisters on feet.  No complaints of excessive thirst.  No reports of blurry vision.  No significant changes to weight.    Saw eye doctor 11/20/23.    She states she is taking her medications as directed.    BP Readings from Last 2 Encounters:   03/22/24 130/82   10/27/23 128/80     Hemoglobin A1C (%)   Date Value   03/22/2024 10.0 (H)   09/28/2023 8.4 (H)   03/08/2021 7.7 (H)   07/30/2020 8.1 (H)     LDL Cholesterol Calculated (mg/dL)   Date Value   04/21/2023 39   09/16/2022 48   01/30/2017 63   03/23/2016 89     LDL Cholesterol Direct (mg/dL)   Date Value   07/30/2020 56.0   03/15/2019 47.0         How many servings of fruits and vegetables do you eat daily?  4 or more  On average, how many sweetened beverages do you drink each day (Examples: soda, juice, sweet tea, etc.  Do NOT count diet or artificially sweetened beverages)?   0, drinks water and tea  How many days per week do you exercise enough to make your heart beat faster? 3 or less  How many minutes a day do you exercise enough to make your heart beat faster? 30 - 60  How many days per week do you miss taking your medication? 0    Takes care of her children. 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              Objective    /82   Pulse 83   Temp 98  F (36.7  C) (Oral)   Resp 16   Ht 1.5 m (4' 11.06\")   Wt 54.4 kg (120 lb)   SpO2 99%   BMI 24.19 kg/m    Body mass index is 24.19 kg/m .  Physical Exam   GENERAL: alert and no distress  RESP: lungs clear to auscultation - no rales, rhonchi or wheezes  CV: regular rate and rhythm, normal S1 S2, no S3 or S4, no murmur, click or rub, no peripheral edema  MS: no gross musculoskeletal defects noted, no edema  Diabetic foot exam: normal DP and PT pulses, no trophic changes or ulcerative lesions, and normal sensory exam            Signed Electronically by: " Mary Almanza, DO

## 2024-03-22 NOTE — PATIENT INSTRUCTIONS
Blood sugars   Please check blood sugars in the morning before your meal and then 1-2 hours after the morning meal and check before bed.  Write the values down.     Please check the amount of sugar in your tea. If there is sugar, please consider reducing your intake. Try a flavored water without sugar.

## 2024-03-22 NOTE — LETTER
March 27, 2024      John Chun  [address] [place] [address]        Dear ,    We are writing to inform you of your test results.    Lab results are consistent with your elevated blood sugars. Please continue to take your medications.   We will see you in June Resulted Orders   Hemoglobin A1c   Result Value Ref Range    Hemoglobin A1C 10.0 (H) 0.0 - 5.6 %      Comment:      Normal <5.7%   Prediabetes 5.7-6.4%    Diabetes 6.5% or higher     Note: Adopted from ADA consensus guidelines.   Comprehensive metabolic panel   Result Value Ref Range    Sodium 135 135 - 145 mmol/L      Comment:      Reference intervals for this test were updated on 09/26/2023 to more accurately reflect our healthy population. There may be differences in the flagging of prior results with similar values performed with this method. Interpretation of those prior results can be made in the context of the updated reference intervals.     Potassium 3.9 3.4 - 5.3 mmol/L    Carbon Dioxide (CO2) 22 22 - 29 mmol/L    Anion Gap 14 7 - 15 mmol/L    Urea Nitrogen 21.4 (H) 6.0 - 20.0 mg/dL    Creatinine 0.57 0.51 - 0.95 mg/dL    GFR Estimate >90 >60 mL/min/1.73m2    Calcium 9.6 8.6 - 10.0 mg/dL    Chloride 99 98 - 107 mmol/L    Glucose 174 (H) 70 - 99 mg/dL    Alkaline Phosphatase 81 40 - 150 U/L      Comment:      Reference intervals for this test were updated on 11/14/2023 to more accurately reflect our healthy population. There may be differences in the flagging of prior results with similar values performed with this method. Interpretation of those prior results can be made in the context of the updated reference intervals.    AST 28 0 - 45 U/L      Comment:      Reference intervals for this test were updated on 6/12/2023 to more accurately reflect our healthy population. There may be differences in the flagging of prior results with similar values performed with this method. Interpretation of those prior results can be made in the  context of the updated reference intervals.    ALT 33 0 - 50 U/L      Comment:      Reference intervals for this test were updated on 6/12/2023 to more accurately reflect our healthy population. There may be differences in the flagging of prior results with similar values performed with this method. Interpretation of those prior results can be made in the context of the updated reference intervals.      Protein Total 7.8 6.4 - 8.3 g/dL    Albumin 4.6 3.5 - 5.2 g/dL    Bilirubin Total 0.6 <=1.2 mg/dL   Lipid Profile   Result Value Ref Range    Cholesterol 134 <200 mg/dL    Triglycerides 157 (H) <150 mg/dL    Direct Measure HDL 46 (L) >=50 mg/dL    LDL Cholesterol Calculated 57 <=100 mg/dL    Non HDL Cholesterol 88 <130 mg/dL    Patient Fasting > 8hrs? Unknown     Narrative    Cholesterol  Desirable:  <200 mg/dL    Triglycerides  Normal:  Less than 150 mg/dL  Borderline High:  150-199 mg/dL  High:  200-499 mg/dL  Very High:  Greater than or equal to 500 mg/dL    Direct Measure HDL  Female:  Greater than or equal to 50 mg/dL   Male:  Greater than or equal to 40 mg/dL    LDL Cholesterol  Desirable:  <100mg/dL  Above Desirable:  100-129 mg/dL   Borderline High:  130-159 mg/dL   High:  160-189 mg/dL   Very High:  >= 190 mg/dL    Non HDL Cholesterol  Desirable:  130 mg/dL  Above Desirable:  130-159 mg/dL  Borderline High:  160-189 mg/dL  High:  190-219 mg/dL  Very High:  Greater than or equal to 220 mg/dL   Albumin Random Urine Quantitative with Creat Ratio   Result Value Ref Range    Creatinine Urine mg/dL 41.6 mg/dL      Comment:      The reference ranges have not been established in urine creatinine. The results should be integrated into the clinical context for interpretation.    Albumin Urine mg/L <12.0 mg/L      Comment:      The reference ranges have not been established in urine albumin. The results should be integrated into the clinical context for interpretation.    Albumin Urine mg/g Cr        Comment:       Unable to calculate, urine albumin and/or urine creatinine is outside detectable limits.  Microalbuminuria is defined as an albumin:creatinine ratio of 17 to 299 for males and 25 to 299 for females. A ratio of albumin:creatinine of 300 or higher is indicative of overt proteinuria.  Due to biologic variability, positive results should be confirmed by a second, first-morning random or 24-hour timed urine specimen. If there is discrepancy, a third specimen is recommended. When 2 out of 3 results are in the microalbuminuria range, this is evidence for incipient nephropathy and warrants increased efforts at glucose control, blood pressure control, and institution of therapy with an angiotensin-converting-enzyme (ACE) inhibitor (if the patient can tolerate it).         If you have any questions or concerns, please call the clinic at the number listed above.       Sincerely,      Mary Almanza, DO

## 2024-03-23 LAB
ALBUMIN SERPL BCG-MCNC: 4.6 G/DL (ref 3.5–5.2)
ALP SERPL-CCNC: 81 U/L (ref 40–150)
ALT SERPL W P-5'-P-CCNC: 33 U/L (ref 0–50)
ANION GAP SERPL CALCULATED.3IONS-SCNC: 14 MMOL/L (ref 7–15)
AST SERPL W P-5'-P-CCNC: 28 U/L (ref 0–45)
BILIRUB SERPL-MCNC: 0.6 MG/DL
BUN SERPL-MCNC: 21.4 MG/DL (ref 6–20)
CALCIUM SERPL-MCNC: 9.6 MG/DL (ref 8.6–10)
CHLORIDE SERPL-SCNC: 99 MMOL/L (ref 98–107)
CHOLEST SERPL-MCNC: 134 MG/DL
CREAT SERPL-MCNC: 0.57 MG/DL (ref 0.51–0.95)
DEPRECATED HCO3 PLAS-SCNC: 22 MMOL/L (ref 22–29)
EGFRCR SERPLBLD CKD-EPI 2021: >90 ML/MIN/1.73M2
FASTING STATUS PATIENT QL REPORTED: ABNORMAL
GLUCOSE SERPL-MCNC: 174 MG/DL (ref 70–99)
HDLC SERPL-MCNC: 46 MG/DL
LDLC SERPL CALC-MCNC: 57 MG/DL
NONHDLC SERPL-MCNC: 88 MG/DL
POTASSIUM SERPL-SCNC: 3.9 MMOL/L (ref 3.4–5.3)
PROT SERPL-MCNC: 7.8 G/DL (ref 6.4–8.3)
SODIUM SERPL-SCNC: 135 MMOL/L (ref 135–145)
TRIGL SERPL-MCNC: 157 MG/DL

## 2024-07-24 ENCOUNTER — PATIENT OUTREACH (OUTPATIENT)
Dept: FAMILY MEDICINE | Facility: CLINIC | Age: 59
End: 2024-07-24
Payer: COMMERCIAL

## 2024-08-12 ENCOUNTER — OFFICE VISIT (OUTPATIENT)
Dept: FAMILY MEDICINE | Facility: CLINIC | Age: 59
End: 2024-08-12
Payer: COMMERCIAL

## 2024-08-12 VITALS
RESPIRATION RATE: 20 BRPM | HEART RATE: 76 BPM | OXYGEN SATURATION: 99 % | BODY MASS INDEX: 22.98 KG/M2 | TEMPERATURE: 98.6 F | SYSTOLIC BLOOD PRESSURE: 127 MMHG | DIASTOLIC BLOOD PRESSURE: 79 MMHG | WEIGHT: 114 LBS | HEIGHT: 59 IN

## 2024-08-12 DIAGNOSIS — E11.65 TYPE 2 DIABETES MELLITUS WITH HYPERGLYCEMIA, WITHOUT LONG-TERM CURRENT USE OF INSULIN (H): Primary | ICD-10-CM

## 2024-08-12 DIAGNOSIS — Z11.59 NEED FOR HEPATITIS B SCREENING TEST: ICD-10-CM

## 2024-08-12 PROBLEM — Z12.4 CERVICAL CANCER SCREENING: Status: ACTIVE | Noted: 2018-12-26

## 2024-08-12 LAB
ALBUMIN SERPL BCG-MCNC: 4.5 G/DL (ref 3.5–5.2)
ALP SERPL-CCNC: 67 U/L (ref 40–150)
ALT SERPL W P-5'-P-CCNC: 31 U/L (ref 0–50)
ANION GAP SERPL CALCULATED.3IONS-SCNC: 10 MMOL/L (ref 7–15)
AST SERPL W P-5'-P-CCNC: 42 U/L (ref 0–45)
BILIRUB SERPL-MCNC: 0.7 MG/DL
BUN SERPL-MCNC: 16 MG/DL (ref 6–20)
CALCIUM SERPL-MCNC: 9.8 MG/DL (ref 8.8–10.4)
CHLORIDE SERPL-SCNC: 99 MMOL/L (ref 98–107)
CREAT SERPL-MCNC: 0.58 MG/DL (ref 0.51–0.95)
EGFRCR SERPLBLD CKD-EPI 2021: >90 ML/MIN/1.73M2
GLUCOSE SERPL-MCNC: 175 MG/DL (ref 70–99)
HBA1C MFR BLD: 8.4 % (ref 0–5.6)
HBV CORE AB SERPL QL IA: NONREACTIVE
HBV SURFACE AB SERPL IA-ACNC: 175 M[IU]/ML
HBV SURFACE AB SERPL IA-ACNC: REACTIVE M[IU]/ML
HBV SURFACE AG SERPL QL IA: NONREACTIVE
HCO3 SERPL-SCNC: 26 MMOL/L (ref 22–29)
POTASSIUM SERPL-SCNC: 4.8 MMOL/L (ref 3.4–5.3)
PROT SERPL-MCNC: 8.2 G/DL (ref 6.4–8.3)
SODIUM SERPL-SCNC: 135 MMOL/L (ref 135–145)

## 2024-08-12 PROCEDURE — 86706 HEP B SURFACE ANTIBODY: CPT | Performed by: FAMILY MEDICINE

## 2024-08-12 PROCEDURE — 36415 COLL VENOUS BLD VENIPUNCTURE: CPT | Performed by: FAMILY MEDICINE

## 2024-08-12 PROCEDURE — 90471 IMMUNIZATION ADMIN: CPT | Performed by: FAMILY MEDICINE

## 2024-08-12 PROCEDURE — 90715 TDAP VACCINE 7 YRS/> IM: CPT | Performed by: FAMILY MEDICINE

## 2024-08-12 PROCEDURE — 80053 COMPREHEN METABOLIC PANEL: CPT | Performed by: FAMILY MEDICINE

## 2024-08-12 PROCEDURE — 83036 HEMOGLOBIN GLYCOSYLATED A1C: CPT | Performed by: FAMILY MEDICINE

## 2024-08-12 PROCEDURE — 99214 OFFICE O/P EST MOD 30 MIN: CPT | Mod: 25 | Performed by: FAMILY MEDICINE

## 2024-08-12 PROCEDURE — 86704 HEP B CORE ANTIBODY TOTAL: CPT | Performed by: FAMILY MEDICINE

## 2024-08-12 PROCEDURE — 87340 HEPATITIS B SURFACE AG IA: CPT | Performed by: FAMILY MEDICINE

## 2024-08-12 RX ORDER — ASPIRIN 81 MG/1
81 TABLET, COATED ORAL DAILY
COMMUNITY
Start: 2024-08-12

## 2024-08-12 NOTE — PROGRESS NOTES
Need Td vaccine  Assessment & Plan     Type 2 diabetes mellitus with hyperglycemia, without long-term current use of insulin (H)      - Hemoglobin A1c  - Comprehensive metabolic panel  - blood glucose (NO BRAND SPECIFIED) lancets standard  Dispense: 100 each; Refill: 3  - sitagliptin (JANUVIA) 100 MG tablet  Dispense: 90 tablet; Refill: 3  Continue with metformin and glipizide  - ASPIRIN LOW DOSE 81 MG EC tablet    She did not want to make anymore changes with her medications. She will work on decreasing her carb intake at dinner.    Need for hepatitis B screening test  - Hepatitis B Surface Antibody  - Hepatitis B surface antigen  - Hepatitis B core antibody    The longitudinal plan of care for the diagnosis(es)/condition(s) as documented were addressed during this visit. Due to the added complexity in care, I will continue to support John in the subsequent management and with ongoing continuity of care.          Return in about 3 months (around 11/12/2024) for DM recheck.    Sonya Lopez is a 59 year old, presenting for the following health issues:  Diabetes (Recheck diabetes and A1C)        8/12/2024     7:58 AM   Additional Questions   Roomed by Ru     [facility]           Diabetes Follow-up    How often are you checking your blood sugar? One time daily  What time of day are you checking your blood sugars (select all that apply)?  Before and after meals  Have you had any blood sugars above 200?  Yes After dinner could be 250, morning fasting is 120-170  Have you had any blood sugars below 70?  No  What symptoms do you notice when your blood sugar is low?  None  What concerns do you have today about your diabetes? None   Do you have any of these symptoms? (Select all that apply)  No numbness or tingling in feet.  No redness, sores or blisters on feet.  No complaints of excessive thirst.  No reports of blurry vision.  No significant changes to weight.    Last saw eye doctor in 11/2023.    BP Readings from Last 2  "Encounters:   08/12/24 127/79   03/22/24 130/82     Hemoglobin A1C (%)   Date Value   08/12/2024 8.4 (H)   03/22/2024 10.0 (H)   03/08/2021 7.7 (H)   07/30/2020 8.1 (H)       Reviewed her lipid panel from 3/2024. Continue with statin therapy.  LDL Cholesterol Calculated (mg/dL)   Date Value   03/22/2024 57   04/21/2023 39   01/30/2017 63   03/23/2016 89     LDL Cholesterol Direct (mg/dL)   Date Value   07/30/2020 56.0   03/15/2019 47.0         How many servings of fruits and vegetables do you eat daily?  2-3  On average, how many sweetened beverages do you drink each day (Examples: soda, juice, sweet tea, etc.  Do NOT count diet or artificially sweetened beverages)?   0  How many days per week do you exercise enough to make your heart beat faster? 3 or less  How many minutes a day do you exercise enough to make your heart beat faster? 10 - 19  How many days per week do you miss taking your medication? 0    Wt Readings from Last 2 Encounters:   08/12/24 51.7 kg (114 lb)   03/22/24 54.4 kg (120 lb)       Using glipizide 10mg daily, metformin 2000mg daily and Januvia 100mg daily, had declined injectable at last visit.            Objective    /79   Pulse 76   Temp 98.6  F (37  C) (Oral)   Resp 20   Ht 1.5 m (4' 11.06\")   Wt 51.7 kg (114 lb)   SpO2 99%   BMI 22.98 kg/m    Body mass index is 22.98 kg/m .  Physical Exam   GENERAL: alert and no distress  RESP: lungs clear to auscultation - no rales, rhonchi or wheezes  CV: regular rate and rhythm, normal S1 S2, no S3 or S4, no murmur, click or rub, no peripheral edema  MS: no gross musculoskeletal defects noted, no edema            Signed Electronically by: Mary Almanza, DO    "

## 2024-08-12 NOTE — PATIENT INSTRUCTIONS
Decrease the amount of rice and bread with your meals.    Please see the eye doctor at [facility]. Call to schedule.  Dr. Orantes  Address: [address] [place] [id number]  Hours:   Phone: [phone]

## 2024-08-12 NOTE — LETTER
August 15, 2024      John Chun  [address] [place] [address]        Dear ,  All of your labs are stable and look good. You do have immunity to Hepatitis B, so you do not need the vaccine at this time.   Thank you for coming into clinic for your visit with us.     Mary Almanza DO     Resulted Orders   Hepatitis B Surface Antibody   Result Value Ref Range    Hepatitis B Surface Antibody Reactive       Comment:      A reactive result indicates recovery from acute or chronic hepatitis B virus (HBV) infection or acquired immunity from HBV vaccination. This assay does not differentiate between a vaccine-induced immune response and an immune response induced by infection with HBV. A positive total antihepatitis B core result would indicate that the hepatitis B surface antibody response is due to past HBV infection.    Hepatitis B Surface Antibody Instrument Value 175.00 <8.5 m[IU]/mL   Hepatitis B surface antigen   Result Value Ref Range    Hepatitis B Surface Antigen Nonreactive Nonreactive   Hepatitis B core antibody   Result Value Ref Range    Hepatitis B Core Antibody Total Nonreactive Nonreactive      Comment:      Nonreactive hepatitis B core antibody test results indicate the absence of exposure to hepatitis B virus and no evidence of recent, past/resolved, or chronic hepatitis B.    Hemoglobin A1c   Result Value Ref Range    Hemoglobin A1C 8.4 (H) 0.0 - 5.6 %      Comment:      Normal <5.7%   Prediabetes 5.7-6.4%    Diabetes 6.5% or higher     Note: Adopted from ADA consensus guidelines.    Narrative    Results consistent with previous, repeat testing unnecessary     Comprehensive metabolic panel   Result Value Ref Range    Sodium 135 135 - 145 mmol/L    Potassium 4.8 3.4 - 5.3 mmol/L    Carbon Dioxide (CO2) 26 22 - 29 mmol/L    Anion Gap 10 7 - 15 mmol/L    Urea Nitrogen 16.0 6.0 - 20.0 mg/dL    Creatinine 0.58 0.51 - 0.95 mg/dL    GFR Estimate >90 >60 mL/min/1.73m2      Comment:      eGFR  calculated using 2021 CKD-EPI equation.    Calcium 9.8 8.8 - 10.4 mg/dL      Comment:      Reference intervals for this test were updated on 7/16/2024 to reflect our healthy population more accurately. There may be differences in the flagging of prior results with similar values performed with this method. Those prior results can be interpreted in the context of the updated reference intervals.    Chloride 99 98 - 107 mmol/L    Glucose 175 (H) 70 - 99 mg/dL    Alkaline Phosphatase 67 40 - 150 U/L    AST 42 0 - 45 U/L    ALT 31 0 - 50 U/L    Protein Total 8.2 6.4 - 8.3 g/dL    Albumin 4.5 3.5 - 5.2 g/dL    Bilirubin Total 0.7 <=1.2 mg/dL       If you have any questions or concerns, please call the clinic at the number listed above.       Sincerely,      Mary Almanza, DO

## 2024-12-10 ENCOUNTER — PATIENT OUTREACH (OUTPATIENT)
Dept: FAMILY MEDICINE | Facility: CLINIC | Age: 59
End: 2024-12-10
Payer: COMMERCIAL

## 2024-12-10 NOTE — TELEPHONE ENCOUNTER
Please help patient schedule an appointment for diabetes recheck. Last A1C was elevated. I could see her on 12- at 4PM.

## 2024-12-26 ENCOUNTER — OFFICE VISIT (OUTPATIENT)
Dept: FAMILY MEDICINE | Facility: CLINIC | Age: 59
End: 2024-12-26
Payer: COMMERCIAL

## 2024-12-26 VITALS
WEIGHT: 119 LBS | HEART RATE: 81 BPM | RESPIRATION RATE: 20 BRPM | OXYGEN SATURATION: 99 % | TEMPERATURE: 98.4 F | BODY MASS INDEX: 23.99 KG/M2 | SYSTOLIC BLOOD PRESSURE: 133 MMHG | DIASTOLIC BLOOD PRESSURE: 77 MMHG | HEIGHT: 59 IN

## 2024-12-26 DIAGNOSIS — Z12.31 VISIT FOR SCREENING MAMMOGRAM: ICD-10-CM

## 2024-12-26 DIAGNOSIS — E78.5 HYPERLIPIDEMIA LDL GOAL <100: ICD-10-CM

## 2024-12-26 DIAGNOSIS — Z12.11 SCREEN FOR COLON CANCER: ICD-10-CM

## 2024-12-26 DIAGNOSIS — E11.65 TYPE 2 DIABETES MELLITUS WITH HYPERGLYCEMIA, WITHOUT LONG-TERM CURRENT USE OF INSULIN (H): Primary | ICD-10-CM

## 2024-12-26 DIAGNOSIS — Z23 NEED FOR PROPHYLACTIC VACCINATION AND INOCULATION AGAINST INFLUENZA: ICD-10-CM

## 2024-12-26 LAB
ALBUMIN SERPL BCG-MCNC: 4.4 G/DL (ref 3.5–5.2)
ALP SERPL-CCNC: 79 U/L (ref 40–150)
ALT SERPL W P-5'-P-CCNC: 32 U/L (ref 0–50)
ANION GAP SERPL CALCULATED.3IONS-SCNC: 11 MMOL/L (ref 7–15)
AST SERPL W P-5'-P-CCNC: 32 U/L (ref 0–45)
BILIRUB SERPL-MCNC: 0.7 MG/DL
BUN SERPL-MCNC: 15 MG/DL (ref 6–20)
CALCIUM SERPL-MCNC: 9.8 MG/DL (ref 8.8–10.4)
CHLORIDE SERPL-SCNC: 99 MMOL/L (ref 98–107)
CHOLEST SERPL-MCNC: 147 MG/DL
CREAT SERPL-MCNC: 0.56 MG/DL (ref 0.51–0.95)
CREAT UR-MCNC: 31 MG/DL
EGFRCR SERPLBLD CKD-EPI 2021: >90 ML/MIN/1.73M2
EST. AVERAGE GLUCOSE BLD GHB EST-MCNC: 209 MG/DL
FASTING STATUS PATIENT QL REPORTED: NO
FASTING STATUS PATIENT QL REPORTED: NO
GLUCOSE SERPL-MCNC: 155 MG/DL (ref 70–99)
HBA1C MFR BLD: 8.9 % (ref 0–5.6)
HCO3 SERPL-SCNC: 25 MMOL/L (ref 22–29)
HDLC SERPL-MCNC: 49 MG/DL
LDLC SERPL CALC-MCNC: 56 MG/DL
MICROALBUMIN UR-MCNC: <12 MG/L
MICROALBUMIN/CREAT UR: NORMAL MG/G{CREAT}
NONHDLC SERPL-MCNC: 98 MG/DL
POTASSIUM SERPL-SCNC: 3.7 MMOL/L (ref 3.4–5.3)
PROT SERPL-MCNC: 7.8 G/DL (ref 6.4–8.3)
SODIUM SERPL-SCNC: 135 MMOL/L (ref 135–145)
TRIGL SERPL-MCNC: 212 MG/DL

## 2024-12-26 RX ORDER — GLIPIZIDE 10 MG/1
10 TABLET, FILM COATED, EXTENDED RELEASE ORAL DAILY
Qty: 90 TABLET | Refills: 3 | Status: SHIPPED | OUTPATIENT
Start: 2024-12-26

## 2024-12-26 RX ORDER — ATORVASTATIN CALCIUM 20 MG/1
20 TABLET, FILM COATED ORAL DAILY
Qty: 90 TABLET | Refills: 3 | Status: SHIPPED | OUTPATIENT
Start: 2024-12-26

## 2024-12-26 NOTE — PROGRESS NOTES
Assessment & Plan     Type 2 diabetes mellitus with hyperglycemia, without long-term current use of insulin (H)  Increase metformin to take 2 tabs twice a day (she had only been taking 2 tabs in the evening). She is hesitant to use any injectable medications.    - Hemoglobin A1c  - Comprehensive metabolic panel  - Lipid Profile  - Albumin Random Urine Quantitative with Creat Ratio  - sitagliptin (JANUVIA) 100 MG tablet  Dispense: 90 tablet; Refill: 3  - glipiZIDE (GLIPIZIDE XL) 10 MG 24 hr tablet  Dispense: 90 tablet; Refill: 3  - blood glucose (NO BRAND SPECIFIED) test strip  Dispense: 100 strip; Refill: 3    Screen for colon cancer  Received a kit from [facility] for colon cancer screening. She will try to complete. She plans to complete after the holidays.    Visit for screening mammogram  Plan for mammogram in Feb    Hyperlipidemia LDL goal <100  - atorvastatin (LIPITOR) 20 MG tablet  Dispense: 90 tablet; Refill: 3    Need for prophylactic vaccination and inoculation against influenza  Vaccine provided today      The longitudinal plan of care for the diagnosis(es)/condition(s) as documented were addressed during this visit. Due to the added complexity in care, I will continue to support John in the subsequent management and with ongoing continuity of care.            Return in about 3 months (around 3/26/2025) for DM recheck.    Sonya Lopez is a 59 year old, presenting for the following health issues:  Diabetes (Follow up)    HPI       Diabetes Follow-up    How often are you checking your blood sugar? One time daily  What time of day are you checking your blood sugars (select all that apply)?  Before meals (130-170)  Have you had any blood sugars above 200?  Yes , after meals  Have you had any blood sugars below 70?  No  What symptoms do you notice when your blood sugar is low?  None  What concerns do you have today about your diabetes? None   Do you have any of these symptoms? (Select all that apply)  No  "numbness or tingling in feet.  No redness, sores or blisters on feet.  No complaints of excessive thirst.  No reports of blurry vision.  No significant changes to weight.    Had eye doctor appointment in November. Had a follow up appointment on 12/19/24.   Has been prescribed two tabs of 1000mg metformin twice a day, but has only been taking two tabs at night.    BP Readings from Last 2 Encounters:   12/26/24 133/77   08/12/24 127/79     Hemoglobin A1C (%)   Date Value   12/26/2024 8.9 (H)   08/12/2024 8.4 (H)   03/08/2021 7.7 (H)   07/30/2020 8.1 (H)     LDL Cholesterol Calculated (mg/dL)   Date Value   03/22/2024 57   04/21/2023 39   01/30/2017 63   03/23/2016 89     LDL Cholesterol Direct (mg/dL)   Date Value   07/30/2020 56.0   03/15/2019 47.0             HCM: Received a kit from [facility] for colon cancer screening. She will try to complete. She plans to complete after the holidays.    History   Smoking Status    Never   Smokeless Tobacco    Never             Objective    /77   Pulse 81   Temp 98.4  F (36.9  C) (Tympanic)   Resp 20   Ht 1.499 m (4' 11\")   Wt 54 kg (119 lb)   SpO2 99%   BMI 24.04 kg/m    Body mass index is 24.04 kg/m .  Physical Exam   GENERAL: alert and no distress  RESP: lungs clear to auscultation - no rales, rhonchi or wheezes  CV: regular rate and rhythm, normal S1 S2, no S3 or S4, no murmur, click or rub, no peripheral edema  MS: no gross musculoskeletal defects noted, no edema            Signed Electronically by: Mary Almanza DO    "

## 2025-01-01 NOTE — RESULT ENCOUNTER NOTE
John,    Your labs are stable. As discussed at your visit, your A1C is high consistent with not well controlled diabetes. Please continue to take your medications as prescribed.    Mary Almanza, DO

## 2025-04-02 ENCOUNTER — TELEPHONE (OUTPATIENT)
Dept: FAMILY MEDICINE | Facility: CLINIC | Age: 60
End: 2025-04-02
Payer: COMMERCIAL

## 2025-04-02 NOTE — TELEPHONE ENCOUNTER
Patient Quality Outreach    Patient is due for the following:   Diabetes -  A1C  Breast Cancer Screening - Mammogram    Action(s) Taken:   I called pt, no answer message left to call clinic back to schedule follow up visit    Type of outreach:    Phone, left message for patient/parent to call back.    Questions for provider review:    None         Nga Moncada MA  Chart routed to None.

## 2025-04-27 NOTE — PROGRESS NOTES
Assessment & Plan     Type 2 diabetes mellitus with hyperglycemia, without long-term current use of insulin (H)    Goal of less than 7. Today's A1C is 8.6%  - Increase metformin to 2 tablets in the morning and 2 at night. Continue current medications: metformin, glipizide, Januvia, atorvastatin, and lisinopril.   Refill prescriptions for Januvia and lisinopril. Monitor blood sugar levels and kidney function regularly.  Has been hesitant of injectable medications   - LA FOOT EXAM NO CHARGE  - Hemoglobin A1c  - Comprehensive metabolic panel  - sitagliptin (JANUVIA) 100 MG tablet  Dispense: 90 tablet; Refill: 3    Benign essential hypertension  - lisinopril (ZESTRIL) 5 MG tablet  Dispense: 90 tablet; Refill: 3    Screen for colon cancer  - Complete and return the stool sample kit for colon cancer screening.    Itchy eyes  - Has been using eye drop from her eye doctor. Feels it is improving.    Encounter for screening mammogram for breast cancer  Plan for mammogram 7-2-2025  - MA Screening Bilateral w/ [facility]      Consent was obtained from the patient to use an AI documentation tool in the creation of this note.    The longitudinal plan of care for the diagnosis(es)/condition(s) as documented were addressed during this visit. Due to the added complexity in care, I will continue to support John in the subsequent management and with ongoing continuity of care.          Subjective   John is a 60 year old, presenting for the following health issues:  Diabetes (Dm check/)    HPI - John Chun, 60-year-old female.    Diabetes:  - Blood sugar levels fluctuate, sometimes low, sometimes high in the morning, with recent readings around 159.  - Diagnosed with diabetes in 2008.  - Feels she will have better blood sugars control over the summer as she becomes more active and gardens.  - Has been taking medications as directed, except only 1 metformin in AM and 2 in evening.  - Saw her eye doctor in 11/2024  - On statin and  "aspirin    Itchy eyes:  - Left eye itchy since Friday, right eye was itchy but resolved. Using Refresh eye drops as advised by a doctor. Not painful. No blurry vision. Saw her eye doctor in 11/2024    HCM:  - Has a stool sample kit from [facility] for colon cancer screening, not yet returned. Plans to do it soon.  - Due for breast cancer screening with a mammogram. Schedule for 7-2-2025.  - declined pneumo vaccine today                  Objective    /86   Pulse 82   Temp 98  F (36.7  C) (Oral)   Resp 16   Ht 1.5 m (4' 11.06\")   Wt 52.2 kg (115 lb)   SpO2 99%   BMI 23.18 kg/m    Body mass index is 23.18 kg/m .  Physical Exam   GENERAL: alert and no distress  NECK: no adenopathy, no asymmetry, masses, or scars  HEENT: Left eye is red and watering  RESP: lungs clear to auscultation - no rales, rhonchi or wheezes  CV: regular rate and rhythm, normal S1 S2, no S3 or S4, no murmur, click or rub, no peripheral edema  MS: no gross musculoskeletal defects noted, no edema  Diabetic foot exam: normal DP and PT pulses, no trophic changes or ulcerative lesions, and normal sensory exam            Signed Electronically by: Mary Almanza DO      "

## 2025-04-28 ENCOUNTER — OFFICE VISIT (OUTPATIENT)
Dept: FAMILY MEDICINE | Facility: CLINIC | Age: 60
End: 2025-04-28
Payer: COMMERCIAL

## 2025-04-28 ENCOUNTER — TELEPHONE (OUTPATIENT)
Dept: FAMILY MEDICINE | Facility: CLINIC | Age: 60
End: 2025-04-28

## 2025-04-28 VITALS
RESPIRATION RATE: 16 BRPM | OXYGEN SATURATION: 99 % | HEIGHT: 59 IN | WEIGHT: 115 LBS | SYSTOLIC BLOOD PRESSURE: 127 MMHG | HEART RATE: 82 BPM | BODY MASS INDEX: 23.18 KG/M2 | DIASTOLIC BLOOD PRESSURE: 86 MMHG | TEMPERATURE: 98 F

## 2025-04-28 DIAGNOSIS — E11.65 TYPE 2 DIABETES MELLITUS WITH HYPERGLYCEMIA, WITHOUT LONG-TERM CURRENT USE OF INSULIN (H): Primary | ICD-10-CM

## 2025-04-28 DIAGNOSIS — I10 BENIGN ESSENTIAL HYPERTENSION: ICD-10-CM

## 2025-04-28 DIAGNOSIS — H57.9 ITCHY EYES: ICD-10-CM

## 2025-04-28 DIAGNOSIS — Z12.31 ENCOUNTER FOR SCREENING MAMMOGRAM FOR BREAST CANCER: ICD-10-CM

## 2025-04-28 DIAGNOSIS — Z12.11 SCREEN FOR COLON CANCER: ICD-10-CM

## 2025-04-28 LAB
ALBUMIN SERPL BCG-MCNC: 4.5 G/DL (ref 3.5–5.2)
ALP SERPL-CCNC: 85 U/L (ref 40–150)
ALT SERPL W P-5'-P-CCNC: 35 U/L (ref 0–50)
ANION GAP SERPL CALCULATED.3IONS-SCNC: 12 MMOL/L (ref 7–15)
AST SERPL W P-5'-P-CCNC: 35 U/L (ref 0–45)
BILIRUB SERPL-MCNC: 0.4 MG/DL
BUN SERPL-MCNC: 18.8 MG/DL (ref 8–23)
CALCIUM SERPL-MCNC: 9.5 MG/DL (ref 8.8–10.4)
CHLORIDE SERPL-SCNC: 101 MMOL/L (ref 98–107)
CREAT SERPL-MCNC: 0.56 MG/DL (ref 0.51–0.95)
EGFRCR SERPLBLD CKD-EPI 2021: >90 ML/MIN/1.73M2
EST. AVERAGE GLUCOSE BLD GHB EST-MCNC: 200 MG/DL
GLUCOSE SERPL-MCNC: 200 MG/DL (ref 70–99)
HBA1C MFR BLD: 8.6 % (ref 0–5.6)
HCO3 SERPL-SCNC: 25 MMOL/L (ref 22–29)
POTASSIUM SERPL-SCNC: 4.1 MMOL/L (ref 3.4–5.3)
PROT SERPL-MCNC: 7.8 G/DL (ref 6.4–8.3)
SODIUM SERPL-SCNC: 138 MMOL/L (ref 135–145)

## 2025-04-28 RX ORDER — LISINOPRIL 5 MG/1
5 TABLET ORAL DAILY
Qty: 90 TABLET | Refills: 3 | Status: SHIPPED | OUTPATIENT
Start: 2025-04-28

## 2025-04-28 NOTE — Clinical Note
Please schedule her for early morning appointment for July [facility] bus. And let her know to increase her metformin to 2 tabs in morning and PM because her A1C is still above 8%.

## 2025-04-28 NOTE — LETTER
May 1, 2025      John Chun  [address] [place] [address]        Dear ,    We are writing to inform you of your test results.    Your A1C is still above 8%. Please adjust your metformin as we discussed.   I will see you back in 3 months for a recheck.     Resulted Orders   Hemoglobin A1c   Result Value Ref Range    Estimated Average Glucose 200 (H) <117 mg/dL    Hemoglobin A1C 8.6 (H) 0.0 - 5.6 %      Comment:      Normal <5.7%   Prediabetes 5.7-6.4%    Diabetes 6.5% or higher     Note: Adopted from ADA consensus guidelines.    Narrative    Results consistent with previous, repeat testing unnecessary     Comprehensive metabolic panel   Result Value Ref Range    Sodium 138 135 - 145 mmol/L    Potassium 4.1 3.4 - 5.3 mmol/L    Carbon Dioxide (CO2) 25 22 - 29 mmol/L    Anion Gap 12 7 - 15 mmol/L    Urea Nitrogen 18.8 8.0 - 23.0 mg/dL    Creatinine 0.56 0.51 - 0.95 mg/dL    GFR Estimate >90 >60 mL/min/1.73m2      Comment:      eGFR calculated using 2021 CKD-EPI equation.    Calcium 9.5 8.8 - 10.4 mg/dL    Chloride 101 98 - 107 mmol/L    Glucose 200 (H) 70 - 99 mg/dL    Alkaline Phosphatase 85 40 - 150 U/L    AST 35 0 - 45 U/L    ALT 35 0 - 50 U/L    Protein Total 7.8 6.4 - 8.3 g/dL    Albumin 4.5 3.5 - 5.2 g/dL    Bilirubin Total 0.4 <=1.2 mg/dL       If you have any questions or concerns, please call the clinic at the number listed above.       Sincerely,      Mary Almanza, DO

## 2025-04-28 NOTE — TELEPHONE ENCOUNTER
MA call pt and LVMS for pt to call back. Please schedule her for early morning appointment for July mammo bus. And let her know to increase her metformin to 2 tabs in morning and PM because her A1C is still above 8%. Per Dr. Almanza. Thanks

## 2025-04-29 ENCOUNTER — PATIENT OUTREACH (OUTPATIENT)
Dept: CARE COORDINATION | Facility: CLINIC | Age: 60
End: 2025-04-29
Payer: COMMERCIAL

## 2025-05-07 ENCOUNTER — TELEPHONE (OUTPATIENT)
Dept: FAMILY MEDICINE | Facility: CLINIC | Age: 60
End: 2025-05-07
Payer: COMMERCIAL

## 2025-05-07 NOTE — TELEPHONE ENCOUNTER
Patient Quality Outreach    Patient is due for the following:   Breast Cancer Screening - Mammogram    Action(s) Taken:   Patient was scheduled for 7//2 @ 830am    Type of outreach:    Phone, spoke to patient/parent.      Questions for provider review:    None         Nga Moncada MA  Chart routed to None.

## 2025-07-02 ENCOUNTER — ANCILLARY PROCEDURE (OUTPATIENT)
Facility: CLINIC | Age: 60
End: 2025-07-02
Attending: FAMILY MEDICINE
Payer: COMMERCIAL

## 2025-07-02 DIAGNOSIS — E11.65 TYPE 2 DIABETES MELLITUS WITH HYPERGLYCEMIA, WITHOUT LONG-TERM CURRENT USE OF INSULIN (H): ICD-10-CM

## 2025-07-02 DIAGNOSIS — Z12.31 ENCOUNTER FOR SCREENING MAMMOGRAM FOR BREAST CANCER: ICD-10-CM

## 2025-07-02 PROCEDURE — 77063 BREAST TOMOSYNTHESIS BI: CPT | Mod: TC | Performed by: RADIOLOGY

## 2025-07-02 PROCEDURE — 77067 SCR MAMMO BI INCL CAD: CPT | Mod: TC | Performed by: RADIOLOGY

## 2025-07-02 RX ORDER — METFORMIN HYDROCHLORIDE 500 MG/1
1000 TABLET, EXTENDED RELEASE ORAL 2 TIMES DAILY WITH MEALS
Qty: 360 TABLET | Refills: 0 | Status: SHIPPED | OUTPATIENT
Start: 2025-07-02

## 2025-07-02 NOTE — TELEPHONE ENCOUNTER
Medication Question or Refill        What medication are you calling about (include dose and sig)?: metFORMIN (GLUCOPHAGE XR) 500 MG 24 hr tablet   Sig - Route: TAKE 2 TABLETS(1000 MG) BY MOUTH TWICE DAILY WITH MEALS - Oral     Preferred Pharmacy:       [id number] - [place], [place] - [address] AT [facility] [address]  [address]  [place] [address]  Phone: [phone] Fax: [phone]      Controlled Substance Agreement on file:   CSA -- Patient Level:    CSA: None found at the patient level.       Who prescribed the medication?: PCP     Do you need a refill? Yes    When did you use the medication last? Unsure     Patient offered an appointment? Yes: declined, wants message sent first     Do you have any questions or concerns?  No      Okay to leave a detailed message?: Yes at Cell number on file:    Telephone Information:   Mobile [phone]